# Patient Record
Sex: MALE | Race: WHITE | NOT HISPANIC OR LATINO | Employment: FULL TIME | URBAN - METROPOLITAN AREA
[De-identification: names, ages, dates, MRNs, and addresses within clinical notes are randomized per-mention and may not be internally consistent; named-entity substitution may affect disease eponyms.]

---

## 2017-03-03 ENCOUNTER — ALLSCRIPTS OFFICE VISIT (OUTPATIENT)
Dept: OTHER | Facility: OTHER | Age: 52
End: 2017-03-03

## 2017-03-03 LAB — OCCULT BLD, FECAL IMMUNOLOGICAL (HISTORICAL): NEGATIVE

## 2018-01-10 NOTE — PROGRESS NOTES
Assessment   1  Encounter for preventive health examination (V70 0) (Z00 00)  2  Erectile dysfunction (607 84) (N52 9)    Plan  Encounter for prostate cancer screening, History of hyperlipidemia    · (1) TSH; Status:Active; Requested for:03Mar2017;   Erectile dysfunction    · Start: Cialis 20 MG Oral Tablet; take as directed   · Start: Cialis 5 MG Oral Tablet; TAKE AS DIRECTED  Health Maintenance    · (1) COMPREHENSIVE METABOLIC PANEL; Status:Active; Requested [id number];    · Always use a seat belt and shoulder strap when riding or driving a motor vehicle ;  Status:Complete;   Done: 44GIQ5636 09:30AM   · Drink plenty of fluids ; Status:Complete;   Done: 44FJM2800 09:30AM   · Use a sun block product with an SPF of 15 or more ; Status:Complete;   Done:  90HZH5958 09:30AM   · We recommend that you follow the "Mediterranean diet "; Status:Complete;   Done:  89YWI5741 09:30AM  Health Maintenance, Encounter for prostate cancer screening    · (1) PSA (SCREEN) (Dx V76 44 Screen for Prostate Cancer); Status:Active; Requested  [id number]; Health Maintenance, Encounter for screening for cardiovascular disorders    · (1) LIPID PANEL, FASTING; Status:Active; Requested [id number]; Health Maintenance, History of hyperlipidemia    · (1) CBC/PLT/DIFF; Status:Active; Requested for:03Mar2017;   Refused influenza vaccine    · Temporarily Stop: Fluzone Quadrivalent 0 5 ML Intramuscular Suspension    Discussion/Summary  Impression: health maintenance visit  Currently, he eats a healthy diet  Prostate cancer screening: PSA was ordered  Testicular cancer screening: monthly self testicular exam was advised  Colorectal cancer screening: colorectal cancer screening is current  Screening lab work includes glucose and lipid profile  The immunizations are up to date  Advice and education were given regarding nutrition, aerobic exercise, weight bearing exercise, sunscreen use, helmet use and seat belt use        Chief Complaint  Patient is here today for his annual physical exam, L  Bell/ABBI      History of Present Illness  HM, Adult Male: The patient is being seen for a health maintenance evaluation  The last health maintenance visit was 12 month(s) ago  Social History: Household members include spouse and 1 son(s)  He is   Work status: working full time  The patient has never smoked cigarettes  He reports rare alcohol use  He has never used illicit drugs  General Health: The patient's health since the last visit is described as good  He has regular dental visits  He denies vision problems  He denies hearing loss  Immunizations status: up to date  Lifestyle:  He consumes a diverse and healthy diet  He does not have any weight concerns  He exercises regularly  He does not use tobacco  He denies drug use  Screening: Prostate cancer screening includes prostate-specific antigen testing last year  Testicular cancer screening includes monthly self testicular examinations  Colorectal cancer screening includes a colonoscopy within the past ten years  Metabolic screening includes lipid profile performed within the past five years, glucose screening performed last year and thyroid function test performed last year  Cardiovascular risk factors: no hypertension, no diabetes, no high LDL cholesterol, no low HDL cholesterol, no stress, no obesity, no tobacco use, no illicit drug use and no sedentary lifestyle  General health risks: no elevated prostate-specific antigen, no undescended testis, no previous colon polyps, no inflammatory bowel disease, no osteoporosis risk factors, no asbestos exposure and no radiation exposure  Safety elements used: seat belt, smoke detector and carbon monoxide detector  Review of Systems    Constitutional: No fever or chills, feels well, no tiredness, no recent weight gain or weight loss  Eyes: No complaints of eye pain, no red eyes, no discharge from eyes, no itchy eyes     ENT: no complaints of earache, no hearing loss, no nosebleeds, no nasal discharge, no sore throat, no hoarseness  Cardiovascular: No complaints of slow heart rate, no fast heart rate, no chest pain, no palpitations, no leg claudication, no lower extremity  Respiratory: No complaints of shortness of breath, no wheezing, no cough, no SOB on exertion, no orthopnea or PND  Gastrointestinal: No complaints of abdominal pain, no constipation, no nausea or vomiting, no diarrhea or bloody stools  Genitourinary: No complaints of dysuria, no incontinence, no hesitancy, no nocturia, no genital lesion, no testicular pain  Musculoskeletal: No complaints of arthralgia, no myalgias, no joint swelling or stiffness, no limb pain or swelling  Integumentary: No complaints of skin rash or skin lesions, no itching, no skin wound, no dry skin  Neurological: No compliants of headache, no confusion, no convulsions, no numbness or tingling, no dizziness or fainting, no limb weakness, no difficulty walking  Psychiatric: Is not suicidal, no sleep disturbances, no anxiety or depression, no change in personality, no emotional problems  Endocrine: No complaints of proptosis, no hot flashes, no muscle weakness, no erectile dysfunction, no deepening of the voice, no feelings of weakness  Hematologic/Lymphatic: No complaints of swollen glands, no swollen glands in the neck, does not bleed easily, no easy bruising  Active Problems   1  Colon cancer screening (V76 51) (Z12 11)  2  Encounter for screening for cardiovascular disorders (V81 2) (Z13 6)  3  History of hyperlipidemia (V12 29) (Z86 39)  4  Mid back pain (724 5) (M54 9)  5   Refused influenza vaccine (V64 06) (Z28 21)    Surgical History    · History of Adenoidectomy   · History of Appendectomy   · History of Gallbladder Surgery    Family History  Father    · Family history of Coronary artery disease involving coronary bypass graft of native heart  with other forms of angina pectoris   · Family history of essential hypertension (V17 49) (Z82 49)   · Family history of hypertension (V17 49) (Z82 49)   · Family history of Myocardial infarction involving other coronary artery  Maternal Cousin    · Family history of Myocardial infarction involving other coronary artery    Social History    · Caffeine use (V49 89) (F15 90)   · Drinks coffee   · Minimum alcohol consumption   · Never a smoker   · Patient ingests cola containing caffeine (V49 89) (Z78 9)    Current Meds  1  No Reported Medications Recorded    Allergies   1  No Known Drug Allergies    Vitals   Recorded: 30RTI7286 08:35AM   Temperature 98 F, Tympanic   Heart Rate 52, R Radial   Pulse Quality Normal, R Radial   Respiration Quality Normal   Respiration 16   Systolic 070, LUE, Sitting   Diastolic 80, LUE, Sitting   Height 5 ft 6 5 in   Weight 211 lb    BMI Calculated 33 55   BSA Calculated 2 06   O2 Saturation 96     Physical Exam    Constitutional   General appearance: No acute distress, well appearing and well nourished  Eyes   Conjunctiva and lids: No erythema, swelling or discharge  Pupils and irises: Equal, round, reactive to light  Ophthalmoscopic examination: Normal fundi and optic discs  Ears, Nose, Mouth, and Throat   External inspection of ears and nose: Normal     Otoscopic examination: Tympanic membranes translucent with normal light reflex  Canals patent without erythema  Hearing: Normal     Nasal mucosa, septum, and turbinates: Normal without edema or erythema  Lips, teeth, and gums: Normal, good dentition  Oropharynx: Normal with no erythema, edema, exudate or lesions  Neck   Neck: Supple, symmetric, trachea midline, no masses  Thyroid: Normal, no thyromegaly  Pulmonary   Respiratory effort: No increased work of breathing or signs of respiratory distress  Auscultation of lungs: Clear to auscultation      Cardiovascular   Auscultation of heart: Normal rate and rhythm, normal S1 and S2, no murmurs  Carotid pulses: 2+ bilaterally  Abdominal aorta: Normal     Examination of extremities for edema and/or varicosities: Normal     Abdomen   Abdomen: Non-tender, no masses  Liver and spleen: No hepatomegaly or splenomegaly  Examination for hernias: No hernias appreciated  Anus, perineum, and rectum: Normal sphincter tone, no masses, no prolapse  Stool sample for occult blood: Negative  Genitourinary   Scrotal contents: Normal testes, no masses  Penis: Normal, no lesions  Digital rectal exam of prostate: Normal size, no masses  Lymphatic   Palpation of lymph nodes in neck: No lymphadenopathy  Musculoskeletal   Gait and station: Normal     Inspection/palpation of digits and nails: Normal without clubbing or cyanosis  Inspection/palpation of joints, bones, and muscles: Normal     Range of motion: Normal     Stability: Normal     Muscle strength/tone: Normal     Skin   Skin and subcutaneous tissue: Normal without rashes or lesions  Palpation of skin and subcutaneous tissue: Normal turgor  Neurologic   Cranial nerves: Cranial nerves 2-12 intact  Reflexes: 2+ and symmetric  Sensation: No sensory loss  Psychiatric   Judgment and insight: Normal     Orientation to person, place and time: Normal     Recent and remote memory: Intact  Mood and affect: Normal        Results/Data  PHQ-2 Adult Depression Screening [id number] 08:35AM User, [facility]     Test Name Result Flag Reference   PHQ-2 Adult Depression Score 0     Over the last two weeks, how often have you been bothered by any of the following problems?   Little interest or pleasure in doing things: Not at all - 0  Feeling down, depressed, or hopeless: Not at all - 0   PHQ-2 Adult Depression Screening Negative         Signatures   Electronically signed by : Tomi Arroyo MD; Mar  3 2017  4:43PM EST                       (Author)

## 2018-01-12 VITALS
BODY MASS INDEX: 33.12 KG/M2 | OXYGEN SATURATION: 96 % | HEIGHT: 67 IN | RESPIRATION RATE: 16 BRPM | DIASTOLIC BLOOD PRESSURE: 80 MMHG | HEART RATE: 52 BPM | TEMPERATURE: 98 F | SYSTOLIC BLOOD PRESSURE: 128 MMHG | WEIGHT: 211 LBS

## 2018-07-02 ENCOUNTER — OFFICE VISIT (OUTPATIENT)
Dept: FAMILY MEDICINE CLINIC | Facility: CLINIC | Age: 53
End: 2018-07-02
Payer: COMMERCIAL

## 2018-07-02 VITALS
RESPIRATION RATE: 14 BRPM | WEIGHT: 205 LBS | BODY MASS INDEX: 30.36 KG/M2 | SYSTOLIC BLOOD PRESSURE: 142 MMHG | OXYGEN SATURATION: 99 % | HEIGHT: 69 IN | TEMPERATURE: 98 F | DIASTOLIC BLOOD PRESSURE: 80 MMHG | HEART RATE: 48 BPM

## 2018-07-02 DIAGNOSIS — Z00.00 HEALTH MAINTENANCE EXAMINATION: ICD-10-CM

## 2018-07-02 DIAGNOSIS — Z13.0 SCREENING FOR DEFICIENCY ANEMIA: ICD-10-CM

## 2018-07-02 DIAGNOSIS — Z13.29 SCREENING FOR THYROID DISORDER: ICD-10-CM

## 2018-07-02 DIAGNOSIS — Z28.39 IMMUNIZATION DEFICIENCY: Primary | ICD-10-CM

## 2018-07-02 DIAGNOSIS — Z12.5 SCREENING FOR PROSTATE CANCER: ICD-10-CM

## 2018-07-02 DIAGNOSIS — Z13.220 SCREENING FOR CHOLESTEROL LEVEL: ICD-10-CM

## 2018-07-02 LAB
SL AMB  POCT GLUCOSE, UA: NORMAL
SL AMB LEUKOCYTE ESTERASE,UA: NORMAL
SL AMB POCT BILIRUBIN,UA: NORMAL
SL AMB POCT BLOOD,UA: NORMAL
SL AMB POCT CLARITY,UA: CLEAR
SL AMB POCT COLOR,UA: YELLOW
SL AMB POCT KETONES,UA: NORMAL
SL AMB POCT NITRITE,UA: NORMAL
SL AMB POCT PH,UA: 7
SL AMB POCT SPECIFIC GRAVITY,UA: 1.01
SL AMB POCT URINE PROTEIN: NORMAL
SL AMB POCT UROBILINOGEN: NORMAL

## 2018-07-02 PROCEDURE — 81003 URINALYSIS AUTO W/O SCOPE: CPT | Performed by: FAMILY MEDICINE

## 2018-07-02 PROCEDURE — 99396 PREV VISIT EST AGE 40-64: CPT | Performed by: FAMILY MEDICINE

## 2018-07-02 PROCEDURE — 90715 TDAP VACCINE 7 YRS/> IM: CPT

## 2018-07-02 PROCEDURE — 90471 IMMUNIZATION ADMIN: CPT

## 2018-07-02 RX ORDER — TADALAFIL 20 MG/1
20 TABLET ORAL DAILY PRN
COMMUNITY
End: 2020-03-05 | Stop reason: SDUPTHER

## 2018-07-02 NOTE — PROGRESS NOTES
Assessment/Plan:  Health maintenance  Laboratory evaluation as noted  Mediterranean diet  Regular exercise  Seatbelts  Smoke detectors  CO detectors  Adacel given today  Follow-up 1 year  Home monitoring of blood pressure  Subjective:      Patient ID: Jersey Coe is a 46 y o  male  This is a 58-year-old gentleman who presents for a health maintenance exam             Review of Systems   Constitutional: Negative  HENT: Negative  Eyes: Negative  Respiratory: Negative  Cardiovascular: Negative  Gastrointestinal: Negative  Musculoskeletal: Negative  Neurological: Negative  Objective:      /80 (BP Location: Right arm, Patient Position: Sitting, Cuff Size: Standard)   Pulse (!) 48   Temp 98 °F (36 7 °C) (Temporal)   Resp 14   Ht 5' 9" (1 753 m)   Wt 93 kg (205 lb)   SpO2 99%   BMI 30 27 kg/m²          Physical Exam   Constitutional: He is oriented to person, place, and time  He appears well-developed and well-nourished  HENT:   Head: Normocephalic and atraumatic  Right Ear: External ear normal    Left Ear: External ear normal    Nose: Nose normal    Mouth/Throat: Oropharynx is clear and moist  No oropharyngeal exudate  Eyes: Conjunctivae and EOM are normal  Pupils are equal, round, and reactive to light  Right eye exhibits no discharge  Left eye exhibits no discharge  No scleral icterus  Neck: Normal range of motion  No JVD present  No tracheal deviation present  No thyromegaly present  Cardiovascular: Normal rate, regular rhythm and normal heart sounds  Exam reveals no gallop and no friction rub  No murmur heard  Pulmonary/Chest: Effort normal and breath sounds normal  No stridor  No respiratory distress  He has no wheezes  He has no rales  He exhibits no tenderness  Abdominal: Soft  Bowel sounds are normal  He exhibits no distension and no mass  There is no tenderness  There is no rebound and no guarding     Genitourinary: Prostate normal  Rectal exam shows guaiac negative stool  Musculoskeletal: Normal range of motion  He exhibits no edema, tenderness or deformity  Lymphadenopathy:     He has no cervical adenopathy  Neurological: He is alert and oriented to person, place, and time  No cranial nerve deficit  Psychiatric: He has a normal mood and affect   His behavior is normal  Judgment and thought content normal

## 2018-07-03 LAB
ALBUMIN SERPL-MCNC: 4.3 G/DL (ref 3.5–5.5)
ALBUMIN/GLOB SERPL: 1.5 {RATIO} (ref 1.2–2.2)
ALP SERPL-CCNC: 79 IU/L (ref 39–117)
ALT SERPL-CCNC: 18 IU/L (ref 0–44)
AST SERPL-CCNC: 17 IU/L (ref 0–40)
BASOPHILS # BLD AUTO: 0 X10E3/UL (ref 0–0.2)
BASOPHILS NFR BLD AUTO: 0 %
BILIRUB SERPL-MCNC: 0.4 MG/DL (ref 0–1.2)
BUN SERPL-MCNC: 14 MG/DL (ref 6–24)
BUN/CREAT SERPL: 15 (ref 9–20)
CALCIUM SERPL-MCNC: 9.6 MG/DL (ref 8.7–10.2)
CHLORIDE SERPL-SCNC: 103 MMOL/L (ref 96–106)
CHOLEST SERPL-MCNC: 186 MG/DL (ref 100–199)
CHOLEST/HDLC SERPL: 3.4 RATIO (ref 0–5)
CO2 SERPL-SCNC: 25 MMOL/L (ref 20–29)
CREAT SERPL-MCNC: 0.93 MG/DL (ref 0.76–1.27)
EOSINOPHIL # BLD AUTO: 0.1 X10E3/UL (ref 0–0.4)
EOSINOPHIL NFR BLD AUTO: 2 %
ERYTHROCYTE [DISTWIDTH] IN BLOOD BY AUTOMATED COUNT: 13.5 % (ref 12.3–15.4)
GLOBULIN SER-MCNC: 2.8 G/DL (ref 1.5–4.5)
GLUCOSE SERPL-MCNC: 103 MG/DL (ref 65–99)
HCT VFR BLD AUTO: 44.5 % (ref 37.5–51)
HDLC SERPL-MCNC: 55 MG/DL
HGB BLD-MCNC: 14.8 G/DL (ref 13–17.7)
IMM GRANULOCYTES # BLD: 0 X10E3/UL (ref 0–0.1)
IMM GRANULOCYTES NFR BLD: 0 %
LDLC SERPL CALC-MCNC: 116 MG/DL (ref 0–99)
LYMPHOCYTES # BLD AUTO: 1.4 X10E3/UL (ref 0.7–3.1)
LYMPHOCYTES NFR BLD AUTO: 25 %
MCH RBC QN AUTO: 32.5 PG (ref 26.6–33)
MCHC RBC AUTO-ENTMCNC: 33.3 G/DL (ref 31.5–35.7)
MCV RBC AUTO: 98 FL (ref 79–97)
MONOCYTES # BLD AUTO: 0.4 X10E3/UL (ref 0.1–0.9)
MONOCYTES NFR BLD AUTO: 8 %
NEUTROPHILS # BLD AUTO: 3.8 X10E3/UL (ref 1.4–7)
NEUTROPHILS NFR BLD AUTO: 65 %
PLATELET # BLD AUTO: 197 X10E3/UL (ref 150–379)
POTASSIUM SERPL-SCNC: 5.4 MMOL/L (ref 3.5–5.2)
PROT SERPL-MCNC: 7.1 G/DL (ref 6–8.5)
RBC # BLD AUTO: 4.55 X10E6/UL (ref 4.14–5.8)
SL AMB EGFR AFRICAN AMERICAN: 107 ML/MIN/1.73
SL AMB EGFR NON AFRICAN AMERICAN: 93 ML/MIN/1.73
SL AMB VLDL CHOLESTEROL CALC: 15 MG/DL (ref 5–40)
SODIUM SERPL-SCNC: 142 MMOL/L (ref 134–144)
TRIGL SERPL-MCNC: 74 MG/DL (ref 0–149)
WBC # BLD AUTO: 5.7 X10E3/UL (ref 3.4–10.8)

## 2018-07-04 LAB
PSA FREE MFR SERPL: 28.6 %
PSA FREE SERPL-MCNC: 0.2 NG/ML
PSA SERPL-MCNC: 0.7 NG/ML (ref 0–4)
TSH SERPL DL<=0.005 MIU/L-ACNC: 2.14 UIU/ML (ref 0.45–4.5)

## 2018-08-09 ENCOUNTER — OFFICE VISIT (OUTPATIENT)
Dept: FAMILY MEDICINE CLINIC | Facility: CLINIC | Age: 53
End: 2018-08-09
Payer: COMMERCIAL

## 2018-08-09 VITALS
OXYGEN SATURATION: 98 % | HEART RATE: 54 BPM | SYSTOLIC BLOOD PRESSURE: 122 MMHG | RESPIRATION RATE: 16 BRPM | WEIGHT: 207.6 LBS | DIASTOLIC BLOOD PRESSURE: 80 MMHG | BODY MASS INDEX: 29.06 KG/M2 | TEMPERATURE: 97.1 F | HEIGHT: 71 IN

## 2018-08-09 DIAGNOSIS — M54.12 CERVICAL RADICULOPATHY: ICD-10-CM

## 2018-08-09 PROCEDURE — 99213 OFFICE O/P EST LOW 20 MIN: CPT | Performed by: FAMILY MEDICINE

## 2018-08-09 PROCEDURE — 3008F BODY MASS INDEX DOCD: CPT | Performed by: FAMILY MEDICINE

## 2018-08-09 PROCEDURE — 1036F TOBACCO NON-USER: CPT | Performed by: FAMILY MEDICINE

## 2018-08-09 RX ORDER — METHYLPREDNISOLONE 4 MG/1
TABLET ORAL
Qty: 21 TABLET | Refills: 0 | Status: SHIPPED | OUTPATIENT
Start: 2018-08-09 | End: 2020-02-26

## 2018-08-09 NOTE — PROGRESS NOTES
Assessment/Plan:  Cervical radiculopathy  Medrol Dosepak  Gentle stretching exercises  Alternating heat with ice 10-15 minutes on q 4 hours  Massage therapy  Call with any questions or concerns  Call immediately for change in symptomatology  Diagnoses and all orders for this visit:    Labor abnormality  -     Comprehensive metabolic panel    Cervical radiculopathy  -     Methylprednisolone 4 MG TBPK; Use as directed on package          Subjective:     Patient ID: Britney Roman is a 46 y o  male  This is a 42-year-old gentleman who presents right-sided neck pain which radiates to the right upper extremity  Review of Systems   Constitutional: Negative  Respiratory: Negative  Cardiovascular: Negative  Musculoskeletal: Positive for neck pain and neck stiffness  Neurological: Negative  Objective:     Physical Exam   Constitutional: He is oriented to person, place, and time  He appears well-developed and well-nourished  HENT:   Head: Normocephalic and atraumatic  Musculoskeletal:   Tenderness right trapezius, rhomboids and scapular muscles  Neurological: He is alert and oriented to person, place, and time  No cranial nerve deficit   Coordination normal

## 2018-08-10 LAB
ALBUMIN SERPL-MCNC: 4.3 G/DL (ref 3.5–5.5)
ALBUMIN/GLOB SERPL: 1.7 {RATIO} (ref 1.2–2.2)
ALP SERPL-CCNC: 80 IU/L (ref 39–117)
ALT SERPL-CCNC: 22 IU/L (ref 0–44)
AST SERPL-CCNC: 20 IU/L (ref 0–40)
BILIRUB SERPL-MCNC: 0.4 MG/DL (ref 0–1.2)
BUN SERPL-MCNC: 18 MG/DL (ref 6–24)
BUN/CREAT SERPL: 21 (ref 9–20)
CALCIUM SERPL-MCNC: 9.2 MG/DL (ref 8.7–10.2)
CHLORIDE SERPL-SCNC: 105 MMOL/L (ref 96–106)
CO2 SERPL-SCNC: 23 MMOL/L (ref 20–29)
CREAT SERPL-MCNC: 0.86 MG/DL (ref 0.76–1.27)
GLOBULIN SER-MCNC: 2.5 G/DL (ref 1.5–4.5)
GLUCOSE SERPL-MCNC: 98 MG/DL (ref 65–99)
POTASSIUM SERPL-SCNC: 4.6 MMOL/L (ref 3.5–5.2)
PROT SERPL-MCNC: 6.8 G/DL (ref 6–8.5)
SL AMB EGFR AFRICAN AMERICAN: 114 ML/MIN/1.73
SL AMB EGFR NON AFRICAN AMERICAN: 98 ML/MIN/1.73
SODIUM SERPL-SCNC: 143 MMOL/L (ref 134–144)

## 2018-12-06 ENCOUNTER — OFFICE VISIT (OUTPATIENT)
Dept: FAMILY MEDICINE CLINIC | Facility: CLINIC | Age: 53
End: 2018-12-06
Payer: COMMERCIAL

## 2018-12-06 VITALS
TEMPERATURE: 98.6 F | DIASTOLIC BLOOD PRESSURE: 80 MMHG | WEIGHT: 213 LBS | SYSTOLIC BLOOD PRESSURE: 110 MMHG | RESPIRATION RATE: 12 BRPM | BODY MASS INDEX: 29.82 KG/M2 | OXYGEN SATURATION: 98 % | HEART RATE: 65 BPM | HEIGHT: 71 IN

## 2018-12-06 DIAGNOSIS — J06.9 UPPER RESPIRATORY TRACT INFECTION, UNSPECIFIED TYPE: ICD-10-CM

## 2018-12-06 DIAGNOSIS — J02.9 PHARYNGITIS, UNSPECIFIED ETIOLOGY: Primary | ICD-10-CM

## 2018-12-06 PROCEDURE — 99213 OFFICE O/P EST LOW 20 MIN: CPT | Performed by: NURSE PRACTITIONER

## 2018-12-06 RX ORDER — CEFUROXIME AXETIL 250 MG/1
250 TABLET ORAL EVERY 12 HOURS SCHEDULED
Qty: 14 TABLET | Refills: 0 | Status: SHIPPED | OUTPATIENT
Start: 2018-12-06 | End: 2018-12-13

## 2018-12-06 NOTE — PATIENT INSTRUCTIONS
Increase fluid intake as tolerated  Rest and humidification   Continue medications as directed - watchful waiting for 5-6 days  - antibiotic for full course  - pro-biotic to protect stomach while on medication   - Flonase OTC 1-2 sprays each nostril daily PRN post nasal drip   - Mucinex OTC to loosen secretions   Return to office in one week if symptoms persist or worsen

## 2018-12-08 ENCOUNTER — TELEPHONE (OUTPATIENT)
Dept: OTHER | Facility: OTHER | Age: 53
End: 2018-12-08

## 2018-12-08 NOTE — TELEPHONE ENCOUNTER
Enrique Rani 1965  CONFIDENTIALTY NOTICE: This fax transmission is intended only for the addressee  It contains information that is legally privileged,  confidential or otherwise protected from use or disclosure  If you are not the intended recipient, you are strictly prohibited from reviewing,  disclosing, copying using or disseminating any of this information or taking any action in reliance on or regarding this information  If you have  received this fax in error, please notify us immediately by telephone so that we can arrange for its return to us  Page: 1 of 2  Call Id: [id number]  Health Call  Standard Call Report  Health Call  Patient Name: Enrique Saravia  Gender: Male  : 1965  Age: 46 Y 9 M 12 D  Return Phone  Number: [phone] (Home)  Address: [facility]: [address] [place] [address]  [address]  Practice Name: [address] [place] [address]  Practice Charged:  Physician:  Christa Ness Name: Rajinder Topete  Relationship To  Patient: Spouse  Return Phone Number: [phone] (Home)  Presenting Problem: "My  has a cough and feels  achy "  Service Type: Triage  Charged Service 1: Neris Carter U  38  Name and  Number:  Nurse Assessment  Nurse: Jeannie Greenberg RN, Kailee Herr Date/Time: 2018 11:04:07 AM  Type of assessment required:  ---General (Adult or Child)  Duration of Current S/S  ---Since Thursday morning  Location/Radiation  ---Chest / Nose  Temperature (F) and route:  ---Denies fever  Symptom Specific Meds (Dose/Time):  ---None  Other S/S  ---Dry cough  No runny or stuffy nose  Muscles feel achy  Pain Scale on scale of 1-10, 10 being the worst:  ---Denies pain  Symptom progression:  ---same  Intake and Output  ---Drinking normally / WNL  Last Exam/Treatment:  Enrique Saravia 1965  CONFIDENTIALTY NOTICE: This fax transmission is intended only for the addressee  It contains information that is legally privileged,  confidential or otherwise protected from use or disclosure   If you are not the intended recipient, you are strictly prohibited from reviewing,  disclosing, copying using or disseminating any of this information or taking any action in reliance on or regarding this information  If you have  received this fax in error, please notify us immediately by telephone so that we can arrange for its return to us  Page: 2 of 2  Call Id: [phone]  Nurse Assessment  ---Seen in the office on Thursday and diagnosed with an upper respiratory infection  Was prescribed Ceftin antibiotic but told to wait a few days incase symptoms are viral  in nature  Protocols  Protocol Title Nurse Date/Time  Cough - Acute Non-Productive Otto, ROSARIO, Edith Solis 12/8/2018 11:07:16 AM  Question Caller Affirmed  Disp  Time Disposition Final User  12/8/2018 11:15:35 [address] [place] [address],[address], RN, Edith Solis  12/8/2018 11:17:52 AM RN Triaged Yes Aleksandra Sabillon RN, [facility] Advice Given Per Protocol  HOME CARE: You should be able to treat this at home  REASSURANCE: * Coughing is the way that our lungs remove irritants and  mucus  It helps protect our lungs from getting pneumonia  * You can get a dry hacking cough after a chest cold  Sometimes this type  of cough can last 1-3 weeks, and be worse at night  * You can also get a cough after being exposed to irritating substances like smoke,  strong perfumes, and dust  COUGH DROPS FOR COUGH: * Cough drops can help a lot, especially for mild coughs  They reduce  coughing by soothing your irritated throat and removing that tickle sensation in the back of the throat  * Cough drops also have the  advantage of portability - you can carry them with you  * Cough drops are available over-the-counter (OTC)  HOME REMEDY - HARD  CANDY: Hard candy works just as well as a medicine-flavored OTC cough drops  COUGH MEDICINES: * OTC COUGH SYRUPS:  The most common cough suppressant in OTC cough medications is dextromethorphan  Often the letters 'DM' appear in the name   * OTC  COUGH DROPS: Cough drops can help a lot, especially for mild coughs  They reduce coughing by soothing your irritated throat and  removing that tickle sensation in the back of the throat  Cough drops also have the advantage of portability - you can carry them with  you  * HOME REMEDY - HARD CANDY: Hard candy works just as well as medicine-flavored OTC cough drops  People who have  diabetes should use sugar-free candy  * HOME REMEDY - HONEY: This old home remedy has been shown to help decrease coughing  at night  The adult dosage is 2 teaspoons (10 ml) at bedtime  Honey should not be given to infants under one year of age  OTC COUGH  SYRUP - DEXTROMETHORPHAN: * Cough syrups containing the cough suppressant dextromethorphan (DM) may help decrease your  cough  Cough syrups work best for coughs that keep you awake at night  They can also sometimes help in the late stages of a respiratory  infection when the cough is dry and hacking  They can be used along with cough drops  * Examples: Benylin, Robitussin DM, Vicks 44  Cough Relief * Read the package instructions for dosage, contraindications, and other important information  HUMIDIFIER: If the air is  dry, use a humidifier in the bedroom  (Reason: dry air makes coughs worse) AVOID TOBACCO SMOKE: Smoking or being exposed  to smoke makes coughs much worse  COUGHING SPELLS: * Drink warm fluids  Inhale warm mist  (Reason: both relax the airway and  loosen up the phlegm) * Suck on cough drops or hard candy to coat the irritated throat  CALL BACK IF: * Cough lasts over 3 weeks *  Continuous coughing persists over 2 hours after cough treatment * Fever over 103 F (39 4 C) * Fever lasts more than 3 days * Difficulty  breathing occurs * You become worse  CARE ADVICE given per Cough - Acute Non-Productive (Adult) guideline    Caller Understands: Yes  Caller Disagree/Comply: Comply  PreDisposition: Unsure

## 2018-12-10 PROBLEM — J02.9 PHARYNGITIS: Status: ACTIVE | Noted: 2018-12-10

## 2018-12-10 PROBLEM — J06.9 UPPER RESPIRATORY TRACT INFECTION: Status: ACTIVE | Noted: 2018-12-10

## 2018-12-10 NOTE — PROGRESS NOTES
Assessment/Plan:    Problem List Items Addressed This Visit     Pharyngitis - Primary     Discussed likely viral etiology of illness and instructed pt to perform watchful waiting x's 6-7 days, if no improvement use anbx management as discussed  Instructed pt on supportive care including fluids, rest and nutrition         Upper respiratory tract infection    Relevant Medications    cefuroxime (CEFTIN) 250 mg tablet - HOLD, watchful waiting x's 6-7 days        Patient Instructions   Increase fluid intake as tolerated  Rest and humidification   Continue medications as directed - watchful waiting for 5-6 days  - antibiotic for full course  - pro-biotic to protect stomach while on medication   - Flonase OTC 1-2 sprays each nostril daily PRN post nasal drip   - Mucinex OTC to loosen secretions   Return to office in one week if symptoms persist or worsen        Return in about 1 week (around 12/13/2018)  Subjective:      Patient ID: Zhanna Pate is a 46 y o  male  Chief Complaint   Patient presents with    Cold Like Symptoms    Sore Throat       Sore Throat    This is a new problem  The current episode started yesterday  The problem has been unchanged  There has been no fever  Pertinent negatives include no abdominal pain, congestion, coughing, diarrhea, ear discharge, ear pain, headaches, plugged ear sensation, shortness of breath, swollen glands, trouble swallowing or vomiting  He has tried nothing for the symptoms  The following portions of the patient's history were reviewed and updated as appropriate: allergies, current medications, past family history, past medical history, past social history, past surgical history and problem list     Review of Systems   Constitutional: Negative for chills, fatigue and fever  HENT: Positive for sore throat  Negative for congestion, ear discharge, ear pain and trouble swallowing  Eyes: Negative for discharge     Respiratory: Negative for cough and shortness of breath  Cardiovascular: Negative for chest pain  Gastrointestinal: Negative for abdominal pain, diarrhea and vomiting  Neurological: Negative for headaches  Current Outpatient Prescriptions   Medication Sig Dispense Refill    cefuroxime (CEFTIN) 250 mg tablet Take 1 tablet (250 mg total) by mouth every 12 (twelve) hours for 7 days 14 tablet 0    Methylprednisolone 4 MG TBPK Use as directed on package (Patient not taking: Reported on 12/6/2018 ) 21 tablet 0    tadalafil (CIALIS) 20 MG tablet Take 20 mg by mouth daily as needed for erectile dysfunction       No current facility-administered medications for this visit  Objective:    /80 (BP Location: Right arm, Patient Position: Sitting, Cuff Size: Large)   Pulse 65   Temp 98 6 °F (37 °C) (Temporal)   Resp 12   Ht 5' 10 5" (1 791 m)   Wt 96 6 kg (213 lb)   SpO2 98%   BMI 30 13 kg/m²        Physical Exam   Constitutional: He is oriented to person, place, and time  He appears well-developed and well-nourished  No distress  HENT:   Head: Normocephalic and atraumatic  Eyes: Conjunctivae are normal  Right eye exhibits no discharge  Left eye exhibits no discharge  Neck: Normal range of motion  Neck supple  No thyromegaly present  Cardiovascular: Normal rate, regular rhythm and normal heart sounds  Pulmonary/Chest: Effort normal and breath sounds normal  No respiratory distress  He has no decreased breath sounds  He has no wheezes  He has no rhonchi  He has no rales  Lymphadenopathy:     He has no cervical adenopathy  Neurological: He is alert and oriented to person, place, and time  Skin: Skin is warm and dry  No rash noted  He is not diaphoretic  Psychiatric: He has a normal mood and affect   His behavior is normal  Judgment and thought content normal          MELIZA Hewitt

## 2018-12-10 NOTE — ASSESSMENT & PLAN NOTE
Discussed likely viral etiology of illness and instructed pt to perform watchful waiting x's 6-7 days, if no improvement use anbx management as discussed  Instructed pt on supportive care including fluids, rest and nutrition

## 2018-12-12 NOTE — TELEPHONE ENCOUNTER
Triage     Val Wright routed conversation to [facility] 4 days ago      MishaTiffanie oatesKaren [phone]  Val Wright 4 days ago      Val Wright 4 days ago         Anamaria Born 1965  CONFIDENTIALTY NOTICE: This fax transmission is intended only for the addressee  It contains information that is legally privileged,  confidential or otherwise protected from use or disclosure  If you are not the intended recipient, you are strictly prohibited from reviewing,  disclosing, copying using or disseminating any of this information or taking any action in reliance on or regarding this information  If you have  received this fax in error, please notify us immediately by telephone so that we can arrange for its return to us  Page: 1   Call Id: [id number]  Health Call  Standard Call Report  Health Call  Patient Name: Anamaria Born  Gender: Male  : 1965  Age: 46 Y 9 M 12 D  Return Phone  Number: [phone] (Home)  Address: [facility]/Zip: [address] [place], [place] [id number]  Practice Name: [address] [place] [address]  Practice Charged:  Physician:  [address] [place] [address] Name: Talisha Velez  Relationship To  Patient: Spouse  Return Phone Number: [phone] (Home)  Presenting Problem: "My  has a cough and feels  achy "  Service Type: Triage  Charged Service 1: Neris Carter U  38  Name and  Number:  Nurse Assessment  Nurse: Bear Morales RN, Veronica Marroquin Date/Time: 2018 11:04:07 AM  Type of assessment required:  ---General (Adult or Child)  Duration of Current S/S  ---Since Thursday morning  Location/Radiation  ---Chest / Nose  Temperature (F) and route:  ---Denies fever  Symptom Specific Meds (Dose/Time):  ---None  Other S/S  ---Dry cough  No runny or stuffy nose  Muscles feel achy  Pain Scale on scale of 1-10, 10 being the worst:  ---Denies pain    Symptom progression:  ---same  Intake and Output  ---Drinking normally / WNL  Last Exam/Treatment:  Anamaria Born 1965  CONFIDENTIALTY NOTICE: This fax transmission is intended only for the addressee  It contains information that is legally privileged,  confidential or otherwise protected from use or disclosure  If you are not the intended recipient, you are strictly prohibited from reviewing,  disclosing, copying using or disseminating any of this information or taking any action in reliance on or regarding this information  If you have  received this fax in error, please notify us immediately by telephone so that we can arrange for its return to us  Page: 2 of 2  Call Id: [id number]  Nurse Assessment  ---Seen in the office on Thursday and diagnosed with an upper respiratory infection  Was prescribed Ceftin antibiotic but told to wait a few days incase symptoms are viral  in nature  Protocols  Protocol Title Nurse Date/Time  Cough - Acute Non-Productive ROSARIO Menjivar, Chapo Dickey 12/8/2018 11:07:16 AM  Question Caller Affirmed  Disp  Time Disposition Final User  12/8/2018 11:15:35 [id number] S  Kvng Metz RN, Chapo Dickey  12/8/2018 11:17:52 AM RN Triaged Yes Aubree Lauren RN, [facility] Advice Given Per Protocol  HOME CARE: You should be able to treat this at home  REASSURANCE: * Coughing is the way that our lungs remove irritants and  mucus  It helps protect our lungs from getting pneumonia  * You can get a dry hacking cough after a chest cold  Sometimes this type  of cough can last 1-3 weeks, and be worse at night  * You can also get a cough after being exposed to irritating substances like smoke,  strong perfumes, and dust  COUGH DROPS FOR COUGH: * Cough drops can help a lot, especially for mild coughs  They reduce  coughing by soothing your irritated throat and removing that tickle sensation in the back of the throat  * Cough drops also have the  advantage of portability - you can carry them with you  * Cough drops are available over-the-counter (OTC)   HOME REMEDY - HARD  CANDY: Hard candy works just as well as a medicine-flavored OTC cough drops  COUGH MEDICINES: * OTC COUGH SYRUPS:  The most common cough suppressant in OTC cough medications is dextromethorphan  Often the letters 'DM' appear in the name  * OTC  COUGH DROPS: Cough drops can help a lot, especially for mild coughs  They reduce coughing by soothing your irritated throat and  removing that tickle sensation in the back of the throat  Cough drops also have the advantage of portability - you can carry them with  you  * HOME REMEDY - HARD CANDY: Hard candy works just as well as medicine-flavored OTC cough drops  People who have  diabetes should use sugar-free candy  * HOME REMEDY - HONEY: This old home remedy has been shown to help decrease coughing  at night  The adult dosage is 2 teaspoons (10 ml) at bedtime  Honey should not be given to infants under one year of age  OTC COUGH  SYRUP - DEXTROMETHORPHAN: * Cough syrups containing the cough suppressant dextromethorphan (DM) may help decrease your  cough  Cough syrups work best for coughs that keep you awake at night  They can also sometimes help in the late stages of a respiratory  infection when the cough is dry and hacking  They can be used along with cough drops  * Examples: Benylin, Robitussin DM, Vicks 44  Cough Relief * Read the package instructions for dosage, contraindications, and other important information  HUMIDIFIER: If the air is  dry, use a humidifier in the bedroom  (Reason: dry air makes coughs worse) AVOID TOBACCO SMOKE: Smoking or being exposed  to smoke makes coughs much worse  COUGHING SPELLS: * Drink warm fluids  Inhale warm mist  (Reason: both relax the airway and  loosen up the phlegm) * Suck on cough drops or hard candy to coat the irritated throat  CALL BACK IF: * Cough lasts over 3 weeks *  Continuous coughing persists over 2 hours after cough treatment * Fever over 103 F (39 4 C) * Fever lasts more than 3 days * Difficulty  breathing occurs * You become worse   CARE ADVICE given per Cough - Acute Non-Productive (Adult) guideline    Caller Understands: Yes  Caller Disagree/Comply: Comply  PreDisposition: Unsure

## 2020-02-17 ENCOUNTER — TELEPHONE (OUTPATIENT)
Dept: FAMILY MEDICINE CLINIC | Facility: CLINIC | Age: 55
End: 2020-02-17

## 2020-02-17 PROBLEM — N52.9 ERECTILE DYSFUNCTION: Status: ACTIVE | Noted: 2017-03-03

## 2020-02-17 PROBLEM — Z11.4 SCREENING FOR HIV (HUMAN IMMUNODEFICIENCY VIRUS): Status: ACTIVE | Noted: 2020-02-17

## 2020-02-17 PROBLEM — J06.9 UPPER RESPIRATORY TRACT INFECTION: Status: RESOLVED | Noted: 2018-12-10 | Resolved: 2020-02-17

## 2020-02-17 PROBLEM — Z13.220 SCREENING FOR HYPERLIPIDEMIA: Status: ACTIVE | Noted: 2020-02-17

## 2020-02-17 PROBLEM — Z12.5 ENCOUNTER FOR PROSTATE CANCER SCREENING: Status: ACTIVE | Noted: 2020-02-17

## 2020-02-17 PROBLEM — Z11.59 ENCOUNTER FOR HEPATITIS C SCREENING TEST FOR LOW RISK PATIENT: Status: ACTIVE | Noted: 2020-02-17

## 2020-02-17 PROBLEM — Z13.6 SCREENING FOR HYPERTENSION: Status: ACTIVE | Noted: 2020-02-17

## 2020-02-17 PROBLEM — Z00.00 ROUTINE GENERAL MEDICAL EXAMINATION AT A HEALTH CARE FACILITY: Status: ACTIVE | Noted: 2020-02-17

## 2020-02-17 PROBLEM — J02.9 PHARYNGITIS: Status: RESOLVED | Noted: 2018-12-10 | Resolved: 2020-02-17

## 2020-02-17 PROBLEM — Z13.29 SCREENING FOR HYPOTHYROIDISM: Status: ACTIVE | Noted: 2020-02-17

## 2020-02-19 ENCOUNTER — CLINICAL SUPPORT (OUTPATIENT)
Dept: FAMILY MEDICINE CLINIC | Facility: CLINIC | Age: 55
End: 2020-02-19
Payer: COMMERCIAL

## 2020-02-19 DIAGNOSIS — Z00.00 ROUTINE GENERAL MEDICAL EXAMINATION AT A HEALTH CARE FACILITY: Primary | ICD-10-CM

## 2020-02-19 DIAGNOSIS — Z11.4 SCREENING FOR HIV (HUMAN IMMUNODEFICIENCY VIRUS): ICD-10-CM

## 2020-02-19 DIAGNOSIS — Z13.29 SCREENING FOR HYPOTHYROIDISM: ICD-10-CM

## 2020-02-19 DIAGNOSIS — Z12.5 ENCOUNTER FOR PROSTATE CANCER SCREENING: ICD-10-CM

## 2020-02-19 DIAGNOSIS — Z11.59 ENCOUNTER FOR HEPATITIS C SCREENING TEST FOR LOW RISK PATIENT: ICD-10-CM

## 2020-02-19 DIAGNOSIS — Z13.6 SCREENING FOR HYPERTENSION: ICD-10-CM

## 2020-02-19 DIAGNOSIS — Z13.220 SCREENING FOR HYPERLIPIDEMIA: ICD-10-CM

## 2020-02-19 PROCEDURE — 36415 COLL VENOUS BLD VENIPUNCTURE: CPT

## 2020-02-20 LAB
ALBUMIN SERPL-MCNC: 4.3 G/DL (ref 3.8–4.9)
ALBUMIN/GLOB SERPL: 1.7 {RATIO} (ref 1.2–2.2)
ALP SERPL-CCNC: 77 IU/L (ref 39–117)
ALT SERPL-CCNC: 27 IU/L (ref 0–44)
AST SERPL-CCNC: 20 IU/L (ref 0–40)
BASOPHILS # BLD AUTO: 0 X10E3/UL (ref 0–0.2)
BASOPHILS NFR BLD AUTO: 1 %
BILIRUB SERPL-MCNC: 0.6 MG/DL (ref 0–1.2)
BUN SERPL-MCNC: 15 MG/DL (ref 6–24)
BUN/CREAT SERPL: 17 (ref 9–20)
CALCIUM SERPL-MCNC: 9.5 MG/DL (ref 8.7–10.2)
CHLORIDE SERPL-SCNC: 103 MMOL/L (ref 96–106)
CHOLEST SERPL-MCNC: 198 MG/DL (ref 100–199)
CHOLEST/HDLC SERPL: 3.7 RATIO (ref 0–5)
CO2 SERPL-SCNC: 25 MMOL/L (ref 20–29)
CREAT SERPL-MCNC: 0.86 MG/DL (ref 0.76–1.27)
EOSINOPHIL # BLD AUTO: 0.1 X10E3/UL (ref 0–0.4)
EOSINOPHIL NFR BLD AUTO: 2 %
ERYTHROCYTE [DISTWIDTH] IN BLOOD BY AUTOMATED COUNT: 13.9 % (ref 11.6–15.4)
GLOBULIN SER-MCNC: 2.6 G/DL (ref 1.5–4.5)
GLUCOSE SERPL-MCNC: 99 MG/DL (ref 65–99)
HCT VFR BLD AUTO: 45.8 % (ref 37.5–51)
HCV AB S/CO SERPL IA: <0.1 S/CO RATIO (ref 0–0.9)
HDLC SERPL-MCNC: 54 MG/DL
HGB BLD-MCNC: 14.8 G/DL (ref 13–17.7)
HIV 1+2 AB+HIV1 P24 AG SERPL QL IA: NON REACTIVE
IMM GRANULOCYTES # BLD: 0 X10E3/UL (ref 0–0.1)
IMM GRANULOCYTES NFR BLD: 0 %
LDLC SERPL CALC-MCNC: 129 MG/DL (ref 0–99)
LYMPHOCYTES # BLD AUTO: 1.6 X10E3/UL (ref 0.7–3.1)
LYMPHOCYTES NFR BLD AUTO: 32 %
MCH RBC QN AUTO: 31.7 PG (ref 26.6–33)
MCHC RBC AUTO-ENTMCNC: 32.3 G/DL (ref 31.5–35.7)
MCV RBC AUTO: 98 FL (ref 79–97)
MONOCYTES # BLD AUTO: 0.4 X10E3/UL (ref 0.1–0.9)
MONOCYTES NFR BLD AUTO: 7 %
NEUTROPHILS # BLD AUTO: 2.9 X10E3/UL (ref 1.4–7)
NEUTROPHILS NFR BLD AUTO: 58 %
PLATELET # BLD AUTO: 223 X10E3/UL (ref 150–450)
POTASSIUM SERPL-SCNC: 4.8 MMOL/L (ref 3.5–5.2)
PROT SERPL-MCNC: 6.9 G/DL (ref 6–8.5)
PSA SERPL-MCNC: 0.6 NG/ML (ref 0–4)
RBC # BLD AUTO: 4.67 X10E6/UL (ref 4.14–5.8)
SL AMB EGFR AFRICAN AMERICAN: 113 ML/MIN/1.73
SL AMB EGFR NON AFRICAN AMERICAN: 98 ML/MIN/1.73
SL AMB REFLEX CRITERIA: NORMAL
SL AMB VLDL CHOLESTEROL CALC: 15 MG/DL (ref 5–40)
SODIUM SERPL-SCNC: 143 MMOL/L (ref 134–144)
TRIGL SERPL-MCNC: 73 MG/DL (ref 0–149)
TSH SERPL DL<=0.005 MIU/L-ACNC: 1.3 UIU/ML (ref 0.45–4.5)
WBC # BLD AUTO: 5 X10E3/UL (ref 3.4–10.8)

## 2020-02-26 ENCOUNTER — OFFICE VISIT (OUTPATIENT)
Dept: FAMILY MEDICINE CLINIC | Facility: CLINIC | Age: 55
End: 2020-02-26
Payer: COMMERCIAL

## 2020-02-26 VITALS
OXYGEN SATURATION: 98 % | HEART RATE: 48 BPM | RESPIRATION RATE: 16 BRPM | DIASTOLIC BLOOD PRESSURE: 80 MMHG | BODY MASS INDEX: 30.38 KG/M2 | SYSTOLIC BLOOD PRESSURE: 136 MMHG | WEIGHT: 212.2 LBS | TEMPERATURE: 97.2 F | HEIGHT: 70 IN

## 2020-02-26 DIAGNOSIS — R31.29 MICROSCOPIC HEMATURIA: ICD-10-CM

## 2020-02-26 DIAGNOSIS — Z13.220 SCREENING FOR HYPERLIPIDEMIA: ICD-10-CM

## 2020-02-26 DIAGNOSIS — Z13.29 SCREENING FOR HYPOTHYROIDISM: ICD-10-CM

## 2020-02-26 DIAGNOSIS — Z00.00 ROUTINE GENERAL MEDICAL EXAMINATION AT A HEALTH CARE FACILITY: Primary | ICD-10-CM

## 2020-02-26 DIAGNOSIS — Z13.6 SCREENING FOR HYPERTENSION: ICD-10-CM

## 2020-02-26 DIAGNOSIS — Z12.5 ENCOUNTER FOR PROSTATE CANCER SCREENING: ICD-10-CM

## 2020-02-26 DIAGNOSIS — Z12.11 SCREENING FOR COLON CANCER: ICD-10-CM

## 2020-02-26 DIAGNOSIS — Z82.49 FAMILY HISTORY OF EARLY CAD: ICD-10-CM

## 2020-02-26 LAB
SL AMB  POCT GLUCOSE, UA: 0
SL AMB LEUKOCYTE ESTERASE,UA: 0
SL AMB POCT BILIRUBIN,UA: 0
SL AMB POCT BLOOD,UA: 50
SL AMB POCT CLARITY,UA: CLEAR
SL AMB POCT COLOR,UA: YELLOW
SL AMB POCT FECES OCC BLD: NORMAL
SL AMB POCT KETONES,UA: 0
SL AMB POCT NITRITE,UA: 0
SL AMB POCT PH,UA: 5
SL AMB POCT SPECIFIC GRAVITY,UA: 1.02
SL AMB POCT URINE PROTEIN: 0
SL AMB POCT UROBILINOGEN: 0

## 2020-02-26 PROCEDURE — 81003 URINALYSIS AUTO W/O SCOPE: CPT | Performed by: FAMILY MEDICINE

## 2020-02-26 PROCEDURE — 3008F BODY MASS INDEX DOCD: CPT | Performed by: FAMILY MEDICINE

## 2020-02-26 PROCEDURE — 99396 PREV VISIT EST AGE 40-64: CPT | Performed by: FAMILY MEDICINE

## 2020-02-26 PROCEDURE — 82270 OCCULT BLOOD FECES: CPT | Performed by: FAMILY MEDICINE

## 2020-02-26 PROCEDURE — 93000 ELECTROCARDIOGRAM COMPLETE: CPT | Performed by: FAMILY MEDICINE

## 2020-02-26 NOTE — PATIENT INSTRUCTIONS
DISCUSSED HEALTH MAINTENENCE ISSUES  BW WILL BE OBTAINED  ENCOURAGED HEALTHY DIET AND EXERCISE  COLONOSCOPY WILL BE ORDERED  RV FOR ANNUAL HEALTH EXAM IN 1 YEAR  RV SOONER IF THERE ARE ANY CONCERNS    Recent Results (from the past 672 hour(s))   TSH, 3rd generation    Collection Time: 02/19/20  8:44 AM   Result Value Ref Range    TSH 1 300 0 450 - 4 500 uIU/mL   CBC and differential    Collection Time: 02/19/20  8:44 AM   Result Value Ref Range    White Blood Cell Count 5 0 3 4 - 10 8 x10E3/uL    Red Blood Cell Count 4 67 4 14 - 5 80 x10E6/uL    Hemoglobin 14 8 13 0 - 17 7 g/dL    HCT 45 8 37 5 - 51 0 %    MCV 98 (H) 79 - 97 fL    MCH 31 7 26 6 - 33 0 pg    MCHC 32 3 31 5 - 35 7 g/dL    RDW 13 9 11 6 - 15 4 %    Platelet Count 452 552 - 450 x10E3/uL    Neutrophils 58 Not Estab  %    Lymphocytes 32 Not Estab  %    Monocytes 7 Not Estab  %    Eosinophils 2 Not Estab  %    Basophils PCT 1 Not Estab  %    Neutrophils (Absolute) 2 9 1 4 - 7 0 x10E3/uL    Lymphocytes (Absolute) 1 6 0 7 - 3 1 x10E3/uL    Monocytes (Absolute) 0 4 0 1 - 0 9 x10E3/uL    Eosinophils (Absolute) 0 1 0 0 - 0 4 x10E3/uL    Basophils ABS 0 0 0 0 - 0 2 x10E3/uL    Immature Granulocytes 0 Not Estab  %    Immature Granulocytes (Absolute) 0 0 0 0 - 0 1 x10E3/uL   Comprehensive metabolic panel    Collection Time: 02/19/20  8:44 AM   Result Value Ref Range    Glucose, Random 99 65 - 99 mg/dL    BUN 15 6 - 24 mg/dL    Creatinine 0 86 0 76 - 1 27 mg/dL    eGFR Non  98 >59 mL/min/1 73    eGFR  113 >59 mL/min/1 73    SL AMB BUN/CREATININE RATIO 17 9 - 20    Sodium 143 134 - 144 mmol/L    Potassium 4 8 3 5 - 5 2 mmol/L    Chloride 103 96 - 106 mmol/L    CO2 25 20 - 29 mmol/L    CALCIUM 9 5 8 7 - 10 2 mg/dL    Protein, Total 6 9 6 0 - 8 5 g/dL    Albumin 4 3 3 8 - 4 9 g/dL    Globulin, Total 2 6 1 5 - 4 5 g/dL    Albumin/Globulin Ratio 1 7 1 2 - 2 2    TOTAL BILIRUBIN 0 6 0 0 - 1 2 mg/dL    Alk Phos Isoenzymes 77 39 - 117 IU/L AST 20 0 - 40 IU/L    ALT 27 0 - 44 IU/L   Lipid panel    Collection Time: 02/19/20  8:44 AM   Result Value Ref Range    Cholesterol, Total 198 100 - 199 mg/dL    Triglycerides 73 0 - 149 mg/dL    HDL 54 >39 mg/dL    VLDL Cholesterol Calculated 15 5 - 40 mg/dL    LDL Direct 129 (H) 0 - 99 mg/dL    T   Chol/HDL Ratio 3 7 0 0 - 5 0 ratio   PSA Total (Reflex To Free)    Collection Time: 02/19/20  8:44 AM   Result Value Ref Range    Prostate Specific Antigen Total 0 6 0 0 - 4 0 ng/mL    Reflex Criteria Comment    Human Immunodeficiency Virus 1/2 Antigen / Antibody ( Fourth Generation) with Reflex Testing    Collection Time: 02/19/20  8:44 AM   Result Value Ref Range    HIV Screen 4th Generation wRflx Non Reactive Non Reactive   Hepatitis C antibody    Collection Time: 02/19/20  8:44 AM   Result Value Ref Range    HEP C AB <0 1 0 0 - 0 9 s/co ratio

## 2020-02-26 NOTE — LETTER
[address] [place] [address]    Patient Active Problem List   Diagnosis    Erectile dysfunction    Encounter for hepatitis C screening test for low risk patient    Screening for HIV (human immunodeficiency virus)    Encounter for prostate cancer screening    Screening for hypothyroidism    Screening for hyperlipidemia    Screening for hypertension    Routine general medical examination at a health care facility         Current Outpatient Medications:     tadalafil (CIALIS) 20 MG tablet, Take 20 mg by mouth daily as needed for erectile dysfunction, Disp: , Rfl:       Recent Results (from the past 672 hour(s))   TSH, 3rd generation    Collection Time: 02/19/20  8:44 AM   Result Value Ref Range    TSH 1 300 0 450 - 4 500 uIU/mL   CBC and differential    Collection Time: 02/19/20  8:44 AM   Result Value Ref Range    White Blood Cell Count 5 0 3 4 - 10 8 x10E3/uL    Red Blood Cell Count 4 67 4 14 - 5 80 x10E6/uL    Hemoglobin 14 8 13 0 - 17 7 g/dL    HCT 45 8 37 5 - 51 0 %    MCV 98 (H) 79 - 97 fL    MCH 31 7 26 6 - 33 0 pg    MCHC 32 3 31 5 - 35 7 g/dL    RDW 13 9 11 6 - 15 4 %    Platelet Count 122 899 - 450 x10E3/uL    Neutrophils 58 Not Estab  %    Lymphocytes 32 Not Estab  %    Monocytes 7 Not Estab  %    Eosinophils 2 Not Estab  %    Basophils PCT 1 Not Estab  %    Neutrophils (Absolute) 2 9 1 4 - 7 0 x10E3/uL    Lymphocytes (Absolute) 1 6 0 7 - 3 1 x10E3/uL    Monocytes (Absolute) 0 4 0 1 - 0 9 x10E3/uL    Eosinophils (Absolute) 0 1 0 0 - 0 4 x10E3/uL    Basophils ABS 0 0 0 0 - 0 2 x10E3/uL    Immature Granulocytes 0 Not Estab  %    Immature Granulocytes (Absolute) 0 0 0 0 - 0 1 x10E3/uL   Comprehensive metabolic panel    Collection Time: 02/19/20  8:44 AM   Result Value Ref Range    Glucose, Random 99 65 - 99 mg/dL    BUN 15 6 - 24 mg/dL    Creatinine 0 86 0 76 - 1 27 mg/dL    eGFR Non  98 >59 mL/min/1 73    eGFR  113 >59 mL/min/1 73    SL AMB BUN/CREATININE RATIO 17 9 - 20    Sodium 143 134 - 144 mmol/L    Potassium 4 8 3 5 - 5 2 mmol/L    Chloride 103 96 - 106 mmol/L    CO2 25 20 - 29 mmol/L    CALCIUM 9 5 8 7 - 10 2 mg/dL    Protein, Total 6 9 6 0 - 8 5 g/dL    Albumin 4 3 3 8 - 4 9 g/dL    Globulin, Total 2 6 1 5 - 4 5 g/dL    Albumin/Globulin Ratio 1 7 1 2 - 2 2    TOTAL BILIRUBIN 0 6 0 0 - 1 2 mg/dL    Alk Phos Isoenzymes 77 39 - 117 IU/L    AST 20 0 - 40 IU/L    ALT 27 0 - 44 IU/L   Lipid panel    Collection Time: 02/19/20  8:44 AM   Result Value Ref Range    Cholesterol, Total 198 100 - 199 mg/dL    Triglycerides 73 0 - 149 mg/dL    HDL 54 >39 mg/dL    VLDL Cholesterol Calculated 15 5 - 40 mg/dL    LDL Direct 129 (H) 0 - 99 mg/dL    T   Chol/HDL Ratio 3 7 0 0 - 5 0 ratio   PSA Total (Reflex To Free)    Collection Time: 02/19/20  8:44 AM   Result Value Ref Range    Prostate Specific Antigen Total 0 6 0 0 - 4 0 ng/mL    Reflex Criteria Comment    Human Immunodeficiency Virus 1/2 Antigen / Antibody ( Fourth Generation) with Reflex Testing    Collection Time: 02/19/20  8:44 AM   Result Value Ref Range    HIV Screen 4th Generation wRflx Non Reactive Non Reactive   Hepatitis C antibody    Collection Time: 02/19/20  8:44 AM   Result Value Ref Range    HEP C AB <0 1 0 0 - 0 9 s/co ratio

## 2020-02-26 NOTE — PROGRESS NOTES
BMI Counseling: Body mass index is 30 89 kg/m²  The BMI is above normal  Nutrition recommendations include encouraging healthy choices of fruits and vegetables and moderation in carbohydrate intake  Exercise recommendations include exercising 3-5 times per week  No pharmacotherapy was ordered  FAMILY PRACTICE HEALTH MAINTENANCE OFFICE VISIT  [facility] [place] [address] PHYSICIANS    NAME: Steven Kaufman  AGE: [address] [place] y [place]  SEX: male  : 1965     DATE: 2020    Assessment and Plan     Problem List Items Addressed This Visit        Other    Encounter for prostate cancer screening    Screening for hypothyroidism    Screening for hyperlipidemia    Screening for hypertension    Relevant Orders    POCT urine dip auto non-scope (Completed)    POCT ECG (Completed)    Routine general medical examination at a health care facility - Primary      Other Visit Diagnoses     Screening for colon cancer        Relevant Orders    POCT hemoccult screening (Completed)    Family history of early CAD        Relevant Orders    CT coronary calcium score    Microscopic hematuria        Relevant Orders    Cytology, urine               Return in about 1 year (around 2021) for Annual physical         Chief Complaint     Chief Complaint   Patient presents with    Annual Exam       History of Present Illness     [facility] [address] RECORD  NO CONCERNS AT THIS TIME        Well Adult Physical   Patient here for a comprehensive physical exam       Diet and Physical Activity  Diet: well balanced diet  Weight concerns: Patient has class 1 obesity (BMI 30-34  9)  Exercise: intermittently      Depression Screen  PHQ-9 Depression Screening    PHQ-9:    Frequency of the following problems over the past two weeks:       Little interest or pleasure in doing things:  0 - not at all  Feeling down, depressed, or hopeless:  0 - not at all  PHQ-2 Score:  0          General Health  Hearing: Normal:  bilateral  Vision: no vision problems  Dental: regular dental visits    Reproductive Health          The following portions of the patient's history were reviewed and updated as appropriate: allergies, current medications, past family history, past medical history, past social history, past surgical history and problem list     Review of Systems     Review of Systems   Constitutional: Negative for chills, fatigue and fever  HENT: Negative for congestion, ear discharge, ear pain, mouth sores, postnasal drip, sore throat and trouble swallowing  Eyes: Negative for pain, discharge and visual disturbance  Respiratory: Negative for cough, shortness of breath and wheezing  Cardiovascular: Negative for chest pain, palpitations and leg swelling  Gastrointestinal: Negative for abdominal distention, abdominal pain, blood in stool, diarrhea and nausea  Endocrine: Negative for polydipsia, polyphagia and polyuria  Genitourinary: Negative for dysuria, frequency, hematuria and urgency  Musculoskeletal: Negative for arthralgias, gait problem and joint swelling  Skin: Negative for pallor and rash  Neurological: Negative for dizziness, syncope, speech difficulty, weakness, light-headedness, numbness and headaches  Hematological: Negative for adenopathy  Psychiatric/Behavioral: Negative for behavioral problems, confusion and sleep disturbance  The patient is not nervous/anxious  Past Medical History     History reviewed  No pertinent past medical history      Past Surgical History     Past Surgical History:   Procedure Laterality Date    ADENOIDECTOMY      APPENDECTOMY      GALLBLADDER SURGERY         Social History     Social History     Socioeconomic History    Marital status: /Civil Union     Spouse name: None    Number of children: None    Years of education: None    Highest education level: None   Occupational History    None   Social Needs    Financial resource strain: None  Food insecurity:     Worry: None     Inability: None    Transportation needs:     Medical: None     Non-medical: None   Tobacco Use    Smoking status: Never Smoker    Smokeless tobacco: Never Used   Substance and Sexual Activity    Alcohol use: Yes     Comment: Minimum alcohol consumption    Drug use: No    Sexual activity: None   Lifestyle    Physical activity:     Days per week: None     Minutes per session: None    Stress: None   Relationships    Social connections:     Talks on phone: None     Gets together: None     Attends Jew service: None     Active member of club or organization: None     Attends meetings of clubs or organizations: None     Relationship status: None    Intimate partner violence:     Fear of current or ex partner: None     Emotionally abused: None     Physically abused: None     Forced sexual activity: None   Other Topics Concern    None   Social History Narrative    Caffeine use    Drinks coffee    Patient ingests cola containing caffeine       Family History     Family History   Problem Relation Age of Onset    Coronary artery disease Father 70        CAD involving CABG of native heart with other forms of angina pectoris    Hypertension Father         Essential Hypertension    Heart attack Father         Myocardial Infarction involving other coronary artery    Heart attack Cousin         Myocardial Infarction involving other coronary artery    Mental illness Neg Hx     Substance Abuse Neg Hx        Current Medications       Current Outpatient Medications:     tadalafil (CIALIS) 20 MG tablet, Take 20 mg by mouth daily as needed for erectile dysfunction, Disp: , Rfl:      Allergies     No Known Allergies    Objective     /80   Pulse (!) 48   Temp (!) 97 2 °F (36 2 °C) (Temporal)   Resp 16   Ht 5' 9 5" (1 765 m)   Wt 96 3 kg (212 lb 3 2 oz)   SpO2 98%   BMI 30 89 kg/m²      Physical Exam   Constitutional: He is oriented to person, place, and time   He appears well-developed and well-nourished  HENT:   Head: Normocephalic and atraumatic  Right Ear: External ear normal    Left Ear: External ear normal    Nose: Nose normal    Mouth/Throat: Oropharynx is clear and moist    Eyes: Pupils are equal, round, and reactive to light  Conjunctivae and EOM are normal  Right eye exhibits no discharge  Left eye exhibits no discharge  Neck: Neck supple  No JVD present  No thyromegaly present  Cardiovascular: Normal rate, regular rhythm, normal heart sounds and intact distal pulses  No murmur heard  Pulmonary/Chest: Effort normal and breath sounds normal  He has no wheezes  He has no rales  Abdominal: Soft  Bowel sounds are normal  He exhibits no mass  There is no hepatosplenomegaly  There is no tenderness  There is no rebound, no guarding and no CVA tenderness  Genitourinary: Rectum normal, prostate normal and penis normal  Rectal exam shows guaiac negative stool  Musculoskeletal: Normal range of motion  He exhibits no edema, tenderness or deformity  Lymphadenopathy:     He has no cervical adenopathy  He has no axillary adenopathy  Neurological: He is alert and oriented to person, place, and time  He has normal reflexes  No cranial nerve deficit  He exhibits normal muscle tone  Coordination normal    Skin: Skin is warm and dry  No rash noted  No erythema  Psychiatric: He has a normal mood and affect   His behavior is normal  Judgment and thought content normal          No exam data present    Health Maintenance     Health Maintenance   Topic Date Due    BMI: Followup Plan  04/22/1982    Annual Physical  07/02/2019    Influenza Vaccine  03/24/2020 (Originally 7/1/2019)    Depression Screening PHQ  02/26/2021    BMI: Adult  02/26/2021    CRC Screening: Colonoscopy  09/26/2022    DTaP,Tdap,and Td Vaccines (2 - Td) 07/02/2028    Pneumococcal Vaccine: 65+ Years (1 of 2 - PCV13) 04/22/2029    HIV Screening  Completed    Hepatitis C Screening Completed    Pneumococcal Vaccine: Pediatrics (0 to 5 Years) and At-Risk Patients (6 to 59 Years)  Aged Out    HIB Vaccine  Aged Out    Hepatitis B Vaccine  Aged Out    IPV Vaccine  Aged Out    Hepatitis A Vaccine  Aged Out    Meningococcal ACWY Vaccine  Aged Out    HPV Vaccine  Aged [employer] History   Administered Date(s) Administered    Tdap 07/02/2018       Yosef Joshi MD  [facility] [place]

## 2020-02-28 LAB
COUNSELING NOTE: NORMAL
CYTOLOGIST CVX/VAG CYTO: NORMAL
DX ICD CODE: NORMAL
PATH REPORT.FINAL DX SPEC: NORMAL
PATH REPORT.GROSS SPEC: NORMAL
PATH REPORT.SITE OF ORIGIN SPEC: NORMAL
PATHOLOGIST NAME: NORMAL

## 2020-03-05 ENCOUNTER — TELEPHONE (OUTPATIENT)
Dept: FAMILY MEDICINE CLINIC | Facility: CLINIC | Age: 55
End: 2020-03-05

## 2020-03-05 DIAGNOSIS — N52.9 ERECTILE DYSFUNCTION, UNSPECIFIED ERECTILE DYSFUNCTION TYPE: Primary | ICD-10-CM

## 2020-03-05 RX ORDER — TADALAFIL 20 MG/1
20 TABLET ORAL DAILY PRN
Qty: 10 TABLET | Refills: 1 | Status: SHIPPED | OUTPATIENT
Start: 2020-03-05 | End: 2021-05-19

## 2020-03-06 NOTE — TELEPHONE ENCOUNTER
Patient stated that he spoke to you regarding prescribing Cialis for him  He called the pharmacy and nothing was sent in      Uses SR in [facility]

## 2020-12-11 ENCOUNTER — VBI (OUTPATIENT)
Dept: ADMINISTRATIVE | Facility: OTHER | Age: 55
End: 2020-12-11

## 2020-12-31 DIAGNOSIS — Z13.220 SCREENING FOR HYPERLIPIDEMIA: ICD-10-CM

## 2020-12-31 DIAGNOSIS — Z00.00 ROUTINE GENERAL MEDICAL EXAMINATION AT A HEALTH CARE FACILITY: Primary | ICD-10-CM

## 2020-12-31 DIAGNOSIS — Z13.29 SCREENING FOR HYPOTHYROIDISM: ICD-10-CM

## 2020-12-31 DIAGNOSIS — Z13.6 SCREENING FOR HYPERTENSION: ICD-10-CM

## 2020-12-31 DIAGNOSIS — Z12.5 ENCOUNTER FOR PROSTATE CANCER SCREENING: ICD-10-CM

## 2021-04-08 LAB
ALBUMIN SERPL-MCNC: 4.2 G/DL (ref 3.8–4.9)
ALBUMIN/GLOB SERPL: 1.6 {RATIO} (ref 1.2–2.2)
ALP SERPL-CCNC: 80 IU/L (ref 39–117)
ALT SERPL-CCNC: 20 IU/L (ref 0–44)
AST SERPL-CCNC: 17 IU/L (ref 0–40)
BASOPHILS # BLD AUTO: 0 X10E3/UL (ref 0–0.2)
BASOPHILS NFR BLD AUTO: 1 %
BILIRUB SERPL-MCNC: 0.3 MG/DL (ref 0–1.2)
BUN SERPL-MCNC: 16 MG/DL (ref 6–24)
BUN/CREAT SERPL: 19 (ref 9–20)
CALCIUM SERPL-MCNC: 9.1 MG/DL (ref 8.7–10.2)
CHLORIDE SERPL-SCNC: 104 MMOL/L (ref 96–106)
CHOLEST SERPL-MCNC: 195 MG/DL (ref 100–199)
CHOLEST/HDLC SERPL: 3.9 RATIO (ref 0–5)
CO2 SERPL-SCNC: 26 MMOL/L (ref 20–29)
CREAT SERPL-MCNC: 0.83 MG/DL (ref 0.76–1.27)
EOSINOPHIL # BLD AUTO: 0.1 X10E3/UL (ref 0–0.4)
EOSINOPHIL NFR BLD AUTO: 2 %
ERYTHROCYTE [DISTWIDTH] IN BLOOD BY AUTOMATED COUNT: 12.4 % (ref 11.6–15.4)
GLOBULIN SER-MCNC: 2.6 G/DL (ref 1.5–4.5)
GLUCOSE SERPL-MCNC: 87 MG/DL (ref 65–99)
HCT VFR BLD AUTO: 45.4 % (ref 37.5–51)
HDLC SERPL-MCNC: 50 MG/DL
HGB BLD-MCNC: 14.9 G/DL (ref 13–17.7)
IMM GRANULOCYTES # BLD: 0 X10E3/UL (ref 0–0.1)
IMM GRANULOCYTES NFR BLD: 0 %
LDLC SERPL CALC-MCNC: 126 MG/DL (ref 0–99)
LYMPHOCYTES # BLD AUTO: 1.9 X10E3/UL (ref 0.7–3.1)
LYMPHOCYTES NFR BLD AUTO: 37 %
MCH RBC QN AUTO: 31.1 PG (ref 26.6–33)
MCHC RBC AUTO-ENTMCNC: 32.8 G/DL (ref 31.5–35.7)
MCV RBC AUTO: 95 FL (ref 79–97)
MONOCYTES # BLD AUTO: 0.6 X10E3/UL (ref 0.1–0.9)
MONOCYTES NFR BLD AUTO: 11 %
NEUTROPHILS # BLD AUTO: 2.6 X10E3/UL (ref 1.4–7)
NEUTROPHILS NFR BLD AUTO: 49 %
PLATELET # BLD AUTO: 234 X10E3/UL (ref 150–450)
POTASSIUM SERPL-SCNC: 4.1 MMOL/L (ref 3.5–5.2)
PROT SERPL-MCNC: 6.8 G/DL (ref 6–8.5)
RBC # BLD AUTO: 4.79 X10E6/UL (ref 4.14–5.8)
SL AMB EGFR AFRICAN AMERICAN: 114 ML/MIN/1.73
SL AMB EGFR NON AFRICAN AMERICAN: 98 ML/MIN/1.73
SL AMB VLDL CHOLESTEROL CALC: 19 MG/DL (ref 5–40)
SODIUM SERPL-SCNC: 141 MMOL/L (ref 134–144)
TRIGL SERPL-MCNC: 105 MG/DL (ref 0–149)
TSH SERPL DL<=0.005 MIU/L-ACNC: 1.93 UIU/ML (ref 0.45–4.5)
WBC # BLD AUTO: 5.3 X10E3/UL (ref 3.4–10.8)

## 2021-04-09 LAB
PSA SERPL-MCNC: 0.6 NG/ML (ref 0–4)
SL AMB REFLEX CRITERIA: NORMAL

## 2021-04-12 ENCOUNTER — OFFICE VISIT (OUTPATIENT)
Dept: FAMILY MEDICINE CLINIC | Facility: CLINIC | Age: 56
End: 2021-04-12
Payer: COMMERCIAL

## 2021-04-12 VITALS
HEART RATE: 58 BPM | RESPIRATION RATE: 16 BRPM | HEIGHT: 69 IN | BODY MASS INDEX: 31.25 KG/M2 | SYSTOLIC BLOOD PRESSURE: 128 MMHG | TEMPERATURE: 97.5 F | WEIGHT: 211 LBS | OXYGEN SATURATION: 98 % | DIASTOLIC BLOOD PRESSURE: 74 MMHG

## 2021-04-12 DIAGNOSIS — Z13.220 SCREENING FOR HYPERLIPIDEMIA: ICD-10-CM

## 2021-04-12 DIAGNOSIS — Z13.29 SCREENING FOR HYPOTHYROIDISM: ICD-10-CM

## 2021-04-12 DIAGNOSIS — Z12.11 COLON CANCER SCREENING: ICD-10-CM

## 2021-04-12 DIAGNOSIS — Z13.6 SCREENING FOR HYPERTENSION: ICD-10-CM

## 2021-04-12 DIAGNOSIS — Z82.49 FAMILY HISTORY OF ATHEROSCLEROSIS: ICD-10-CM

## 2021-04-12 DIAGNOSIS — Z00.00 ROUTINE GENERAL MEDICAL EXAMINATION AT A HEALTH CARE FACILITY: Primary | ICD-10-CM

## 2021-04-12 DIAGNOSIS — Z12.5 ENCOUNTER FOR PROSTATE CANCER SCREENING: ICD-10-CM

## 2021-04-12 PROBLEM — Z11.4 SCREENING FOR HIV (HUMAN IMMUNODEFICIENCY VIRUS): Status: RESOLVED | Noted: 2020-02-17 | Resolved: 2021-04-12

## 2021-04-12 PROBLEM — Z11.59 ENCOUNTER FOR HEPATITIS C SCREENING TEST FOR LOW RISK PATIENT: Status: RESOLVED | Noted: 2020-02-17 | Resolved: 2021-04-12

## 2021-04-12 LAB — SL AMB POCT FECES OCC BLD: NORMAL

## 2021-04-12 PROCEDURE — 99396 PREV VISIT EST AGE 40-64: CPT | Performed by: FAMILY MEDICINE

## 2021-04-12 PROCEDURE — 93000 ELECTROCARDIOGRAM COMPLETE: CPT | Performed by: FAMILY MEDICINE

## 2021-04-12 PROCEDURE — 82270 OCCULT BLOOD FECES: CPT | Performed by: FAMILY MEDICINE

## 2021-04-12 PROCEDURE — 3008F BODY MASS INDEX DOCD: CPT | Performed by: FAMILY MEDICINE

## 2021-04-12 PROCEDURE — 3725F SCREEN DEPRESSION PERFORMED: CPT | Performed by: FAMILY MEDICINE

## 2021-04-12 NOTE — PROGRESS NOTES
BMI Counseling: Body mass index is 31 62 kg/m²  The BMI is above normal  Nutrition recommendations include encouraging healthy choices of fruits and vegetables and moderation in carbohydrate intake  Exercise recommendations include exercising 3-5 times per week  No pharmacotherapy was ordered  FAMILY [facility] HEALTH MAINTENANCE OFFICE VISIT  [facility] - [place] [address] PHYSICIANS    NAME: Jovani Ayala  AGE: 63 y o  SEX: male  : 1965     DATE: 2021    Assessment and Plan     Problem List Items Addressed This Visit        Other    Encounter for prostate cancer screening    Screening for hypothyroidism    Screening for hyperlipidemia    Screening for hypertension    Relevant Orders    POCT ECG (Completed)    Colon cancer screening - Primary    Relevant Orders    POCT hemoccult screening      Other Visit Diagnoses     Family history of atherosclerosis        Relevant Orders    CT coronary calcium score               Return in about 1 year (around 2022) for Annual physical         Chief Complaint     Chief Complaint   Patient presents with    Physical Exam     [facility]       History of Present Illness     [address] [place] [address] Rd RECORD  NO CONCERNS AT THIS TIME        Well Adult Physical   Patient here for a comprehensive physical exam       Diet and Physical Activity  Diet: well balanced diet  Weight concerns: Patient has class 1 obesity (BMI 30-34  9)  Exercise: frequently      Depression Screen  PHQ-9 Depression Screening    PHQ-9:   Frequency of the following problems over the past two weeks:      Little interest or pleasure in doing things: 0 - not at all  Feeling down, depressed, or hopeless: 0 - not at all  PHQ-2 Score: 0          General Health  Hearing: Normal:  bilateral  Vision: no vision problems  Dental: regular dental visits    Reproductive Health          The following portions of the patient's history were reviewed and updated as appropriate: allergies, current medications, past family history, past medical history, past social history, past surgical history and problem list     Review of Systems     Review of Systems   Constitutional: Negative for chills, fatigue and fever  HENT: Negative for congestion, ear discharge, ear pain, mouth sores, postnasal drip, sore throat and trouble swallowing  Eyes: Negative for pain, discharge and visual disturbance  Respiratory: Negative for cough, shortness of breath and wheezing  Cardiovascular: Negative for chest pain, palpitations and leg swelling  Gastrointestinal: Negative for abdominal distention, abdominal pain, blood in stool, diarrhea and nausea  Endocrine: Negative for polydipsia, polyphagia and polyuria  Genitourinary: Negative for dysuria, frequency, hematuria and urgency  Musculoskeletal: Negative for arthralgias, gait problem and joint swelling  Skin: Negative for pallor and rash  Neurological: Negative for dizziness, syncope, speech difficulty, weakness, light-headedness, numbness and headaches  Hematological: Negative for adenopathy  Psychiatric/Behavioral: Negative for behavioral problems, confusion and sleep disturbance  The patient is not nervous/anxious  Past Medical History     History reviewed  No pertinent past medical history      Past Surgical History     Past Surgical History:   Procedure Laterality Date    ADENOIDECTOMY      APPENDECTOMY      GALLBLADDER SURGERY         Social History     Social History     Socioeconomic History    Marital status: /Civil Union     Spouse name: None    Number of children: None    Years of education: None    Highest education level: None   Occupational History    None   Social Needs    Financial resource strain: None    Food insecurity     Worry: None     Inability: None    Transportation needs     Medical: None     Non-medical: None   Tobacco Use    Smoking status: Never Smoker    Smokeless tobacco: Never Used   Substance and Sexual Activity    Alcohol use: Yes     Comment: Minimum alcohol consumption    Drug use: No    Sexual activity: None   Lifestyle    Physical activity     Days per week: None     Minutes per session: None    Stress: None   Relationships    Social connections     Talks on phone: None     Gets together: None     Attends Yazidism service: None     Active member of club or organization: None     Attends meetings of clubs or organizations: None     Relationship status: None    Intimate partner violence     Fear of current or ex partner: None     Emotionally abused: None     Physically abused: None     Forced sexual activity: None   Other Topics Concern    None   Social History Narrative    Caffeine use    Drinks coffee    Patient ingests cola containing caffeine       Family History     Family History   Problem Relation Age of Onset    Coronary artery disease Father 70        CAD involving CABG of native heart with other forms of angina pectoris    Hypertension Father         Essential Hypertension    Heart attack Father         Myocardial Infarction involving other coronary artery    Heart attack Cousin         Myocardial Infarction involving other coronary artery    Mental illness Neg Hx     Substance Abuse Neg Hx        Current Medications       Current Outpatient Medications:     tadalafil (CIALIS) 20 MG tablet, Take 1 tablet (20 mg total) by mouth daily as needed for erectile dysfunction (Patient not taking: Reported on 4/12/2021), Disp: 10 tablet, Rfl: 1     Allergies     No Known Allergies    Objective     /74   Pulse 58   Temp 97 5 °F (36 4 °C)   Resp 16   Ht 5' 8 5" (1 74 m)   Wt 95 7 kg (211 lb)   SpO2 98%   BMI 31 62 kg/m²      Physical Exam  Constitutional:       Appearance: He is well-developed  HENT:      Head: Normocephalic and atraumatic        Right Ear: External ear normal       Left Ear: External ear normal       Nose: Nose normal  Eyes:      General:         Right eye: No discharge  Left eye: No discharge  Conjunctiva/sclera: Conjunctivae normal       Pupils: Pupils are equal, round, and reactive to light  Neck:      Musculoskeletal: Neck supple  Thyroid: No thyromegaly  Vascular: No JVD  Cardiovascular:      Rate and Rhythm: Normal rate and regular rhythm  Heart sounds: Normal heart sounds  No murmur  Pulmonary:      Effort: Pulmonary effort is normal       Breath sounds: Normal breath sounds  No wheezing or rales  Abdominal:      General: Bowel sounds are normal       Palpations: Abdomen is soft  There is no mass  Tenderness: There is no abdominal tenderness  There is no guarding or rebound  Genitourinary:     Penis: Normal        Prostate: Normal       Rectum: Normal  Guaiac result negative  Musculoskeletal: Normal range of motion  General: No tenderness or deformity  Lymphadenopathy:      Cervical: No cervical adenopathy  Skin:     General: Skin is warm and dry  Findings: No erythema or rash  Neurological:      Mental Status: He is alert and oriented to person, place, and time  Cranial Nerves: No cranial nerve deficit  Motor: No abnormal muscle tone  Coordination: Coordination normal       Deep Tendon Reflexes: Reflexes are normal and symmetric  Psychiatric:         Behavior: Behavior normal          Thought Content:  Thought content normal          Judgment: Judgment normal            No exam data present    Health Maintenance     Health Maintenance   Topic Date Due    Influenza Vaccine (1) Never done    BMI: Followup Plan  02/26/2021    Annual Physical  02/26/2021    COVID-19 Vaccine (2 - Pfizer 2-dose series) 04/24/2021    Depression Screening PHQ  04/12/2022    BMI: Adult  04/12/2022    Colorectal Cancer Screening  09/26/2022    DTaP,Tdap,and Td Vaccines (2 - Td) 07/02/2028    HIV Screening  Completed    Hepatitis C Screening  Completed    Pneumococcal Vaccine: Pediatrics (0 to 5 Years) and At-Risk Patients (6 to 59 Years)  Aged Out    HIB Vaccine  Aged Out    Hepatitis B Vaccine  Aged Out    IPV Vaccine  Aged Out    Hepatitis A Vaccine  Aged Out    Meningococcal ACWY Vaccine  Aged Out    HPV Vaccine  Aged [employer] History   Administered Date(s) Administered    SARS-CoV-2 / COVID-19 mRNA IM (Pfizer-BioNTech) 04/03/2021    Tdap 07/02/2018       Harper Rodriguez MD  [facility]

## 2021-04-12 NOTE — LETTER
JAY CAPELLAN      Current Outpatient Medications:     tadalafil (CIALIS) 20 MG tablet, Take 1 tablet (20 mg total) by mouth daily as needed for erectile dysfunction (Patient not taking: Reported on 4/12/2021), Disp: 10 tablet, Rfl: 1      Recent Results (from the past 672 hour(s))   CBC and differential    Collection Time: 04/08/21  7:19 AM   Result Value Ref Range    White Blood Cell Count 5 3 3 4 - 10 8 x10E3/uL    Red Blood Cell Count 4 79 4 14 - 5 80 x10E6/uL    Hemoglobin 14 9 13 0 - 17 7 g/dL    HCT 45 4 37 5 - 51 0 %    MCV 95 79 - 97 fL    MCH 31 1 26 6 - 33 0 pg    MCHC 32 8 31 5 - 35 7 g/dL    RDW 12 4 11 6 - 15 4 %    Platelet Count 831 437 - 450 x10E3/uL    Neutrophils 49 Not Estab  %    Lymphocytes 37 Not Estab  %    Monocytes 11 Not Estab  %    Eosinophils 2 Not Estab  %    Basophils PCT 1 Not Estab  %    Neutrophils (Absolute) 2 6 1 4 - 7 0 x10E3/uL    Lymphocytes (Absolute) 1 9 0 7 - 3 1 x10E3/uL    Monocytes (Absolute) 0 6 0 1 - 0 9 x10E3/uL    Eosinophils (Absolute) 0 1 0 0 - 0 4 x10E3/uL    Basophils ABS 0 0 0 0 - 0 2 x10E3/uL    Immature Granulocytes 0 Not Estab  %    Immature Granulocytes (Absolute) 0 0 0 0 - 0 1 x10E3/uL   Comprehensive metabolic panel    Collection Time: 04/08/21  7:19 AM   Result Value Ref Range    Glucose, Random 87 65 - 99 mg/dL    BUN 16 6 - 24 mg/dL    Creatinine 0 83 0 76 - 1 27 mg/dL    eGFR Non  98 >59 mL/min/1 73    eGFR  114 >59 mL/min/1 73    SL AMB BUN/CREATININE RATIO 19 9 - 20    Sodium 141 134 - 144 mmol/L    Potassium 4 1 3 5 - 5 2 mmol/L    Chloride 104 96 - 106 mmol/L    CO2 26 20 - 29 mmol/L    CALCIUM 9 1 8 7 - 10 2 mg/dL    Protein, Total 6 8 6 0 - 8 5 g/dL    Albumin 4 2 3 8 - 4 9 g/dL    Globulin, Total 2 6 1 5 - 4 5 g/dL    Albumin/Globulin Ratio 1 6 1 2 - 2 2    TOTAL BILIRUBIN 0 3 0 0 - 1 2 mg/dL    Alk Phos Isoenzymes 80 39 - 117 IU/L    AST 17 0 - 40 IU/L    ALT 20 0 - 44 IU/L   Lipid panel    Collection Time: 04/08/21  7:19 AM   Result Value Ref Range    Cholesterol, Total 195 100 - 199 mg/dL    Triglycerides 105 0 - 149 mg/dL    HDL 50 >39 mg/dL    VLDL Cholesterol Calculated 19 5 - 40 mg/dL    LDL Calculated 126 (H) 0 - 99 mg/dL    T   Chol/HDL Ratio 3 9 0 0 - 5 0 ratio   PSA Total (Reflex To Free)    Collection Time: 04/08/21  7:19 AM   Result Value Ref Range    Prostate Specific Antigen Total 0 6 0 0 - 4 0 ng/mL    Reflex Criteria Comment    TSH, 3rd generation    Collection Time: 04/08/21  7:19 AM   Result Value Ref Range    TSH 1 930 0 450 - 4 500 uIU/mL

## 2021-04-12 NOTE — PATIENT INSTRUCTIONS
DISCUSSED HEALTH MAINTENENCE ISSUES  BW WILL BE REVIEWED  ENCOURAGED HEALTHY DIET AND EXERCISE  COLONOSCOPY WILL BE ORDERED  RV FOR ANNUAL HEALTH EXAM IN 1 YEAR  RV SOONER IF THERE ARE ANY CONCERNS    Recent Results (from the past 672 hour(s))   CBC and differential    Collection Time: 04/08/21  7:19 AM   Result Value Ref Range    White Blood Cell Count 5 3 3 4 - 10 8 x10E3/uL    Red Blood Cell Count 4 79 4 14 - 5 80 x10E6/uL    Hemoglobin 14 9 13 0 - 17 7 g/dL    HCT 45 4 37 5 - 51 0 %    MCV 95 79 - 97 fL    MCH 31 1 26 6 - 33 0 pg    MCHC 32 8 31 5 - 35 7 g/dL    RDW 12 4 11 6 - 15 4 %    Platelet Count 538 127 - 450 x10E3/uL    Neutrophils 49 Not Estab  %    Lymphocytes 37 Not Estab  %    Monocytes 11 Not Estab  %    Eosinophils 2 Not Estab  %    Basophils PCT 1 Not Estab  %    Neutrophils (Absolute) 2 6 1 4 - 7 0 x10E3/uL    Lymphocytes (Absolute) 1 9 0 7 - 3 1 x10E3/uL    Monocytes (Absolute) 0 6 0 1 - 0 9 x10E3/uL    Eosinophils (Absolute) 0 1 0 0 - 0 4 x10E3/uL    Basophils ABS 0 0 0 0 - 0 2 x10E3/uL    Immature Granulocytes 0 Not Estab  %    Immature Granulocytes (Absolute) 0 0 0 0 - 0 1 x10E3/uL   Comprehensive metabolic panel    Collection Time: 04/08/21  7:19 AM   Result Value Ref Range    Glucose, Random 87 65 - 99 mg/dL    BUN 16 6 - 24 mg/dL    Creatinine 0 83 0 76 - 1 27 mg/dL    eGFR Non  98 >59 mL/min/1 73    eGFR  114 >59 mL/min/1 73    SL AMB BUN/CREATININE RATIO 19 9 - 20    Sodium 141 134 - 144 mmol/L    Potassium 4 1 3 5 - 5 2 mmol/L    Chloride 104 96 - 106 mmol/L    CO2 26 20 - 29 mmol/L    CALCIUM 9 1 8 7 - 10 2 mg/dL    Protein, Total 6 8 6 0 - 8 5 g/dL    Albumin 4 2 3 8 - 4 9 g/dL    Globulin, Total 2 6 1 5 - 4 5 g/dL    Albumin/Globulin Ratio 1 6 1 2 - 2 2    TOTAL BILIRUBIN 0 3 0 0 - 1 2 mg/dL    Alk Phos Isoenzymes 80 39 - 117 IU/L    AST 17 0 - 40 IU/L    ALT 20 0 - 44 IU/L   Lipid panel    Collection Time: 04/08/21  7:19 AM   Result Value Ref Range Cholesterol, Total 195 100 - 199 mg/dL    Triglycerides 105 0 - 149 mg/dL    HDL 50 >39 mg/dL    VLDL Cholesterol Calculated 19 5 - 40 mg/dL    LDL Calculated 126 (H) 0 - 99 mg/dL    T   Chol/HDL Ratio 3 9 0 0 - 5 0 ratio   PSA Total (Reflex To Free)    Collection Time: 04/08/21  7:19 AM   Result Value Ref Range    Prostate Specific Antigen Total 0 6 0 0 - 4 0 ng/mL    Reflex Criteria Comment    TSH, 3rd generation    Collection Time: 04/08/21  7:19 AM   Result Value Ref Range    TSH 1 930 0 450 - 4 500 uIU/mL

## 2021-05-17 ENCOUNTER — TELEPHONE (OUTPATIENT)
Dept: FAMILY MEDICINE CLINIC | Facility: CLINIC | Age: 56
End: 2021-05-17

## 2021-05-17 NOTE — TELEPHONE ENCOUNTER
Reny Shilo returned [employer] call for results  Informed him of his results per Dr Neema Small: Bearl Neighbor scheduled for Wed  No further action required

## 2021-05-19 ENCOUNTER — OFFICE VISIT (OUTPATIENT)
Dept: FAMILY MEDICINE CLINIC | Facility: CLINIC | Age: 56
End: 2021-05-19
Payer: COMMERCIAL

## 2021-05-19 VITALS
BODY MASS INDEX: 29.78 KG/M2 | SYSTOLIC BLOOD PRESSURE: 112 MMHG | HEIGHT: 70 IN | RESPIRATION RATE: 16 BRPM | TEMPERATURE: 97.3 F | WEIGHT: 208 LBS | DIASTOLIC BLOOD PRESSURE: 82 MMHG | OXYGEN SATURATION: 99 % | HEART RATE: 55 BPM

## 2021-05-19 DIAGNOSIS — E78.2 MIXED HYPERLIPIDEMIA: ICD-10-CM

## 2021-05-19 DIAGNOSIS — R93.1 ELEVATED CORONARY ARTERY CALCIUM SCORE: Primary | ICD-10-CM

## 2021-05-19 DIAGNOSIS — N52.9 ERECTILE DYSFUNCTION, UNSPECIFIED ERECTILE DYSFUNCTION TYPE: ICD-10-CM

## 2021-05-19 PROCEDURE — 1036F TOBACCO NON-USER: CPT | Performed by: FAMILY MEDICINE

## 2021-05-19 PROCEDURE — 3008F BODY MASS INDEX DOCD: CPT | Performed by: FAMILY MEDICINE

## 2021-05-19 PROCEDURE — 99213 OFFICE O/P EST LOW 20 MIN: CPT | Performed by: FAMILY MEDICINE

## 2021-05-19 RX ORDER — SILDENAFIL 100 MG/1
100 TABLET, FILM COATED ORAL DAILY PRN
Qty: 10 TABLET | Refills: 3 | Status: SHIPPED | OUTPATIENT
Start: 2021-05-19 | End: 2022-06-09

## 2021-05-19 RX ORDER — ASPIRIN 81 MG/1
81 TABLET ORAL DAILY
Qty: 90 TABLET | Refills: 1
Start: 2021-05-19

## 2021-05-19 NOTE — PATIENT INSTRUCTIONS
DISCUSSED THERAPEUTIC PLAN  ENTERIC ASA DAILY  CONSIDER CRESTOR FOR CHOLESTEROL MANAGEMENT  CARDIAC CONSULT / EST/ ECHO    RV 6 WEEKS FOR REPEAT BW

## 2021-05-19 NOTE — PROGRESS NOTES
Assessment/Plan:    No problem-specific Assessment & Plan notes found for this encounter  Diagnoses and all orders for this visit:    Elevated coronary artery calcium score  -     Ambulatory referral to Cardiology; Future  -     aspirin (ECOTRIN LOW STRENGTH) 81 mg EC tablet; Take 1 tablet (81 mg total) by mouth daily    Mixed hyperlipidemia  -     Ambulatory referral to Cardiology; Future  -     aspirin (ECOTRIN LOW STRENGTH) 81 mg EC tablet; Take 1 tablet (81 mg total) by mouth daily          Patient Instructions   DISCUSSED THERAPEUTIC PLAN  ENTERIC ASA DAILY  CONSIDER CRESTOR FOR CHOLESTEROL MANAGEMENT  CARDIAC CONSULT / EST/ ECHO    RV 6 WEEKS FOR REPEAT BW      Return in about 6 months (around 11/19/2021) for Recheck  Subjective:      Patient ID: Víctor Hay is a 61 y o  male  Chief Complaint   Patient presents with    Follow-up      CT Calcium Score       DISCUSSION    REVIEWED CALCIUM SCORING    REVIEWED THERAPEUTIC OPTIONS  CONCERNED ABOUT STATIN SIDE EFFECTS  RECOMMENDED STARTING ASA, CARDIOLOGY CONSULT      The following portions of the patient's history were reviewed and updated as appropriate: allergies, current medications, past family history, past medical history, past social history, past surgical history and problem list     Review of Systems   Constitutional: Negative for chills, fatigue and fever  HENT: Negative for sore throat  Eyes: Negative for discharge  Respiratory: Negative for cough and chest tightness  Cardiovascular: Negative for chest pain and palpitations  Gastrointestinal: Negative for abdominal pain, diarrhea, nausea and vomiting  Musculoskeletal: Negative for arthralgias and gait problem  Neurological: Negative for dizziness, weakness and headaches  Hematological: Negative for adenopathy  Psychiatric/Behavioral: The patient is not nervous/anxious            Current Outpatient Medications   Medication Sig Dispense Refill    tadalafil (CIALIS) 20 MG tablet Take 1 tablet (20 mg total) by mouth daily as needed for erectile dysfunction 10 tablet 1    aspirin (ECOTRIN LOW STRENGTH) 81 mg EC tablet Take 1 tablet (81 mg total) by mouth daily 90 tablet 1     No current facility-administered medications for this visit          Objective:    /82   Pulse 55   Temp (!) 97 3 °F (36 3 °C) (Temporal)   Resp 16   Ht 5' 10" (1 778 m)   Wt 94 3 kg (208 lb)   SpO2 99%   BMI 29 84 kg/m²        Physical Exam       NO PE WAS PERFORMED    LENGTH OF VISIT 20 MIN  LENGTH OF  20 MIN    Bijan Babb MD

## 2021-05-19 NOTE — LETTER
May 19, 2021     [facility], [address] [facility] [address]    Patient: Cb Steven   YOB: 1965   Date of Visit: 5/19/2021       Dear Dr Wood Serve: Thank you for referring Cb Steven to me for evaluation  Below are my notes for this consultation  If you have questions, please do not hesitate to call me  I look forward to following your patient along with you  Sincerely,        Zoraida Jeong MD        CC: No Recipients  Zoraida Jeong MD  5/19/2021  9:21 AM  Incomplete   Assessment/Plan:    No problem-specific Assessment & Plan notes found for this encounter  Diagnoses and all orders for this visit:    Elevated coronary artery calcium score  -     Ambulatory referral to Cardiology; Future  -     aspirin (ECOTRIN LOW STRENGTH) 81 mg EC tablet; Take 1 tablet (81 mg total) by mouth daily    Mixed hyperlipidemia  -     Ambulatory referral to Cardiology; Future  -     aspirin (ECOTRIN LOW STRENGTH) 81 mg EC tablet; Take 1 tablet (81 mg total) by mouth daily          Patient Instructions   DISCUSSED THERAPEUTIC PLAN  ENTERIC ASA DAILY  CONSIDER CRESTOR FOR CHOLESTEROL MANAGEMENT  CARDIAC CONSULT / EST/ ECHO    RV 6 WEEKS FOR REPEAT BW      No follow-ups on file  Subjective:      Patient ID: Cb Steven is a 61 y o  male  Chief Complaint   Patient presents with    Follow-up      CT Calcium Score       DISCUSSION    REVIEWED CALCIUM SCORING    REVIEWED THERAPEUTIC OPTIONS  CONCERNED ABOUT STATIN SIDE EFFECTS  RECOMMENDED STARTING ASA, CARDIOLOGY CONSULT      The following portions of the patient's history were reviewed and updated as appropriate: allergies, current medications, past family history, past medical history, past social history, past surgical history and problem list     Review of Systems   Constitutional: Negative for chills, fatigue and fever  HENT: Negative for sore throat  Eyes: Negative for discharge     Respiratory: Negative for cough and chest tightness  Cardiovascular: Negative for chest pain and palpitations  Gastrointestinal: Negative for abdominal pain, diarrhea, nausea and vomiting  Musculoskeletal: Negative for arthralgias and gait problem  Neurological: Negative for dizziness, weakness and headaches  Hematological: Negative for adenopathy  Psychiatric/Behavioral: The patient is not nervous/anxious  Current Outpatient Medications   Medication Sig Dispense Refill    tadalafil (CIALIS) 20 MG tablet Take 1 tablet (20 mg total) by mouth daily as needed for erectile dysfunction 10 tablet 1    aspirin (ECOTRIN LOW STRENGTH) 81 mg EC tablet Take 1 tablet (81 mg total) by mouth daily 90 tablet 1     No current facility-administered medications for this visit          Objective:    /82   Pulse 55   Temp (!) 97 3 °F (36 3 °C) (Temporal)   Resp 16   Ht 5' 10" (1 778 m)   Wt 94 3 kg (208 lb)   SpO2 99%   BMI 29 84 kg/m²        Physical Exam       NO PE WAS PERFORMED    LENGTH OF VISIT 20 MIN  LENGTH OF  20 MIN    Abdiaziz Reyna MD

## 2021-10-13 ENCOUNTER — OFFICE VISIT (OUTPATIENT)
Dept: FAMILY MEDICINE CLINIC | Facility: CLINIC | Age: 56
End: 2021-10-13
Payer: COMMERCIAL

## 2021-10-13 VITALS
WEIGHT: 214 LBS | OXYGEN SATURATION: 99 % | HEART RATE: 64 BPM | TEMPERATURE: 97.4 F | SYSTOLIC BLOOD PRESSURE: 128 MMHG | RESPIRATION RATE: 12 BRPM | BODY MASS INDEX: 30.64 KG/M2 | DIASTOLIC BLOOD PRESSURE: 90 MMHG | HEIGHT: 70 IN

## 2021-10-13 DIAGNOSIS — M70.21 OLECRANON BURSITIS OF RIGHT ELBOW: Primary | ICD-10-CM

## 2021-10-13 PROCEDURE — 99213 OFFICE O/P EST LOW 20 MIN: CPT | Performed by: FAMILY MEDICINE

## 2021-10-13 PROCEDURE — 96372 THER/PROPH/DIAG INJ SC/IM: CPT | Performed by: FAMILY MEDICINE

## 2021-10-13 PROCEDURE — 3008F BODY MASS INDEX DOCD: CPT | Performed by: FAMILY MEDICINE

## 2021-10-13 PROCEDURE — 1036F TOBACCO NON-USER: CPT | Performed by: FAMILY MEDICINE

## 2021-10-13 RX ORDER — CEPHALEXIN 500 MG/1
500 CAPSULE ORAL EVERY 12 HOURS SCHEDULED
Qty: 20 CAPSULE | Refills: 0 | Status: SHIPPED | OUTPATIENT
Start: 2021-10-13 | End: 2021-10-23

## 2021-10-13 RX ORDER — ATORVASTATIN CALCIUM 20 MG/1
TABLET, FILM COATED ORAL
COMMUNITY
Start: 2021-10-06

## 2021-10-13 RX ORDER — PREDNISONE 20 MG/1
TABLET ORAL
Qty: 14 TABLET | Refills: 0 | Status: SHIPPED | OUTPATIENT
Start: 2021-10-13 | End: 2022-04-13

## 2021-10-13 RX ORDER — KETOROLAC TROMETHAMINE 30 MG/ML
60 INJECTION, SOLUTION INTRAMUSCULAR; INTRAVENOUS ONCE
Status: COMPLETED | OUTPATIENT
Start: 2021-10-13 | End: 2021-10-13

## 2021-10-13 RX ORDER — DEXAMETHASONE SODIUM PHOSPHATE 4 MG/ML
4 INJECTION, SOLUTION INTRA-ARTICULAR; INTRALESIONAL; INTRAMUSCULAR; INTRAVENOUS; SOFT TISSUE EVERY 6 HOURS SCHEDULED
Status: DISCONTINUED | OUTPATIENT
Start: 2021-10-13 | End: 2021-10-14

## 2021-10-13 RX ADMIN — DEXAMETHASONE SODIUM PHOSPHATE 4 MG: 4 INJECTION, SOLUTION INTRA-ARTICULAR; INTRALESIONAL; INTRAMUSCULAR; INTRAVENOUS; SOFT TISSUE at 14:20

## 2021-10-13 RX ADMIN — KETOROLAC TROMETHAMINE 60 MG: 30 INJECTION, SOLUTION INTRAMUSCULAR; INTRAVENOUS at 14:22

## 2022-03-04 DIAGNOSIS — Z00.00 ROUTINE GENERAL MEDICAL EXAMINATION AT A HEALTH CARE FACILITY: Primary | ICD-10-CM

## 2022-03-04 DIAGNOSIS — Z13.6 SCREENING FOR HYPERTENSION: ICD-10-CM

## 2022-03-04 DIAGNOSIS — E78.2 MIXED HYPERLIPIDEMIA: ICD-10-CM

## 2022-03-04 DIAGNOSIS — Z13.29 SCREENING FOR HYPOTHYROIDISM: ICD-10-CM

## 2022-03-04 DIAGNOSIS — Z12.5 ENCOUNTER FOR PROSTATE CANCER SCREENING: ICD-10-CM

## 2022-03-04 DIAGNOSIS — Z13.220 SCREENING FOR HYPERLIPIDEMIA: ICD-10-CM

## 2022-04-06 ENCOUNTER — RA CDI HCC (OUTPATIENT)
Dept: OTHER | Facility: HOSPITAL | Age: 57
End: 2022-04-06

## 2022-04-06 NOTE — PROGRESS NOTES
[facility]  coding opportunities       Chart reviewed, no opportunity found: CHART REVIEWED, NO OPPORTUNITY FOUND        Patients Insurance        Commercial Insurance: Jennifer Andujar

## 2022-04-07 LAB
BASOPHILS # BLD AUTO: 0 X10E3/UL (ref 0–0.2)
BASOPHILS NFR BLD AUTO: 1 %
EOSINOPHIL # BLD AUTO: 0.1 X10E3/UL (ref 0–0.4)
EOSINOPHIL NFR BLD AUTO: 2 %
ERYTHROCYTE [DISTWIDTH] IN BLOOD BY AUTOMATED COUNT: 12.3 % (ref 11.6–15.4)
HCT VFR BLD AUTO: 42.2 % (ref 37.5–51)
HGB BLD-MCNC: 14.7 G/DL (ref 13–17.7)
IMM GRANULOCYTES # BLD: 0 X10E3/UL (ref 0–0.1)
IMM GRANULOCYTES NFR BLD: 0 %
LYMPHOCYTES # BLD AUTO: 1.6 X10E3/UL (ref 0.7–3.1)
LYMPHOCYTES NFR BLD AUTO: 30 %
MCH RBC QN AUTO: 32.6 PG (ref 26.6–33)
MCHC RBC AUTO-ENTMCNC: 34.8 G/DL (ref 31.5–35.7)
MCV RBC AUTO: 94 FL (ref 79–97)
MONOCYTES # BLD AUTO: 0.5 X10E3/UL (ref 0.1–0.9)
MONOCYTES NFR BLD AUTO: 9 %
NEUTROPHILS # BLD AUTO: 3.1 X10E3/UL (ref 1.4–7)
NEUTROPHILS NFR BLD AUTO: 58 %
PLATELET # BLD AUTO: 214 X10E3/UL (ref 150–450)
RBC # BLD AUTO: 4.51 X10E6/UL (ref 4.14–5.8)
WBC # BLD AUTO: 5.4 X10E3/UL (ref 3.4–10.8)

## 2022-04-08 LAB
ALBUMIN SERPL-MCNC: 4.2 G/DL (ref 3.8–4.9)
ALBUMIN/GLOB SERPL: 1.8 {RATIO} (ref 1.2–2.2)
ALP SERPL-CCNC: 87 IU/L (ref 44–121)
ALT SERPL-CCNC: 29 IU/L (ref 0–44)
AST SERPL-CCNC: 21 IU/L (ref 0–40)
BILIRUB SERPL-MCNC: 0.6 MG/DL (ref 0–1.2)
BUN SERPL-MCNC: 18 MG/DL (ref 6–24)
BUN/CREAT SERPL: 21 (ref 9–20)
CALCIUM SERPL-MCNC: 9.2 MG/DL (ref 8.7–10.2)
CHLORIDE SERPL-SCNC: 104 MMOL/L (ref 96–106)
CHOLEST SERPL-MCNC: 125 MG/DL (ref 100–199)
CHOLEST/HDLC SERPL: 2.7 RATIO (ref 0–5)
CO2 SERPL-SCNC: 24 MMOL/L (ref 20–29)
CREAT SERPL-MCNC: 0.86 MG/DL (ref 0.76–1.27)
EGFR: 101 ML/MIN/1.73
GLOBULIN SER-MCNC: 2.4 G/DL (ref 1.5–4.5)
GLUCOSE SERPL-MCNC: 97 MG/DL (ref 65–99)
HDLC SERPL-MCNC: 47 MG/DL
LDLC SERPL CALC-MCNC: 66 MG/DL (ref 0–99)
POTASSIUM SERPL-SCNC: 4.6 MMOL/L (ref 3.5–5.2)
PROT SERPL-MCNC: 6.6 G/DL (ref 6–8.5)
PSA SERPL-MCNC: 0.5 NG/ML (ref 0–4)
SL AMB REFLEX CRITERIA: NORMAL
SL AMB VLDL CHOLESTEROL CALC: 12 MG/DL (ref 5–40)
SODIUM SERPL-SCNC: 141 MMOL/L (ref 134–144)
TRIGL SERPL-MCNC: 55 MG/DL (ref 0–149)
TSH SERPL DL<=0.005 MIU/L-ACNC: 1.01 UIU/ML (ref 0.45–4.5)

## 2022-04-13 ENCOUNTER — OFFICE VISIT (OUTPATIENT)
Dept: FAMILY MEDICINE CLINIC | Facility: CLINIC | Age: 57
End: 2022-04-13
Payer: COMMERCIAL

## 2022-04-13 VITALS
SYSTOLIC BLOOD PRESSURE: 122 MMHG | BODY MASS INDEX: 33.07 KG/M2 | TEMPERATURE: 97.3 F | DIASTOLIC BLOOD PRESSURE: 76 MMHG | RESPIRATION RATE: 16 BRPM | HEART RATE: 59 BPM | OXYGEN SATURATION: 96 % | HEIGHT: 70 IN | WEIGHT: 231 LBS

## 2022-04-13 DIAGNOSIS — Z00.00 ROUTINE GENERAL MEDICAL EXAMINATION AT A HEALTH CARE FACILITY: ICD-10-CM

## 2022-04-13 DIAGNOSIS — Z13.6 SCREENING FOR HYPERTENSION: ICD-10-CM

## 2022-04-13 DIAGNOSIS — Z12.11 COLON CANCER SCREENING: ICD-10-CM

## 2022-04-13 DIAGNOSIS — Z12.5 ENCOUNTER FOR PROSTATE CANCER SCREENING: ICD-10-CM

## 2022-04-13 DIAGNOSIS — Z13.29 SCREENING FOR HYPOTHYROIDISM: ICD-10-CM

## 2022-04-13 DIAGNOSIS — E78.2 MIXED HYPERLIPIDEMIA: ICD-10-CM

## 2022-04-13 DIAGNOSIS — I77.810 DILATED AORTIC ROOT (HCC): ICD-10-CM

## 2022-04-13 DIAGNOSIS — R93.1 ELEVATED CORONARY ARTERY CALCIUM SCORE: ICD-10-CM

## 2022-04-13 DIAGNOSIS — R31.9 HEMATURIA, UNSPECIFIED TYPE: Primary | ICD-10-CM

## 2022-04-13 PROBLEM — Z13.220 SCREENING FOR HYPERLIPIDEMIA: Status: RESOLVED | Noted: 2020-02-17 | Resolved: 2022-04-13

## 2022-04-13 PROBLEM — M70.21 OLECRANON BURSITIS OF RIGHT ELBOW: Status: RESOLVED | Noted: 2021-10-13 | Resolved: 2022-04-13

## 2022-04-13 LAB
SL AMB  POCT GLUCOSE, UA: ABNORMAL
SL AMB LEUKOCYTE ESTERASE,UA: 0
SL AMB POCT BILIRUBIN,UA: 0
SL AMB POCT BLOOD,UA: 50
SL AMB POCT CLARITY,UA: CLEAR
SL AMB POCT COLOR,UA: YELLOW
SL AMB POCT FECES OCC BLD: NORMAL
SL AMB POCT KETONES,UA: 0
SL AMB POCT NITRITE,UA: 0
SL AMB POCT PH,UA: 5
SL AMB POCT SPECIFIC GRAVITY,UA: 1.01
SL AMB POCT URINE PROTEIN: 0
SL AMB POCT UROBILINOGEN: ABNORMAL

## 2022-04-13 PROCEDURE — 82270 OCCULT BLOOD FECES: CPT | Performed by: FAMILY MEDICINE

## 2022-04-13 PROCEDURE — 99396 PREV VISIT EST AGE 40-64: CPT | Performed by: FAMILY MEDICINE

## 2022-04-13 PROCEDURE — 93000 ELECTROCARDIOGRAM COMPLETE: CPT | Performed by: FAMILY MEDICINE

## 2022-04-13 PROCEDURE — 3725F SCREEN DEPRESSION PERFORMED: CPT | Performed by: FAMILY MEDICINE

## 2022-04-13 PROCEDURE — 3008F BODY MASS INDEX DOCD: CPT | Performed by: FAMILY MEDICINE

## 2022-04-13 PROCEDURE — 1036F TOBACCO NON-USER: CPT | Performed by: FAMILY MEDICINE

## 2022-04-13 PROCEDURE — 81003 URINALYSIS AUTO W/O SCOPE: CPT | Performed by: FAMILY MEDICINE

## 2022-04-13 NOTE — LETTER
2022     Ilya Huttonon, [address] [address]    Patient: Walker Leong   YOB: 1965   Date of Visit: 2022       Dear Dr Elena Smith: Thank you for referring Walker Leong to me for evaluation  Below are my notes for this consultation  If you have questions, please do not hesitate to call me  I look forward to following your patient along with you  Sincerely,        Esvin Del Toro MD        CC: No Recipients  Esvin Del Toro MD  2022  8:33 AM  Addendum  BMI Counseling: Body mass index is 33 15 kg/m²  The BMI is above normal  Nutrition recommendations include encouraging healthy choices of fruits and vegetables and moderation in carbohydrate intake  Exercise recommendations include exercising 3-5 times per week  No pharmacotherapy was ordered  Rationale for BMI follow-up plan is due to patient being overweight or obese  Depression Screening and Follow-up Plan: Patient was screened for depression during today's encounter  They screened negative with a PHQ-2 score of 0     [facility] HEALTH MAINTENANCE OFFICE VISIT  [place] [address] PHYSICIANS    NAME: Walker Leong  AGE: 61 y o   SEX: male  : 1965     DATE: 2022    Assessment and Plan     Problem List Items Addressed This Visit        Cardiovascular and Mediastinum    Elevated coronary artery calcium score    Relevant Orders    POCT ECG (Completed)    Dilated aortic root (HCC)       Other    Encounter for prostate cancer screening    Screening for hypothyroidism    Screening for hypertension    Colon cancer screening    Relevant Orders    POCT hemoccult screening (Completed)    Mixed hyperlipidemia    Relevant Orders    POCT ECG (Completed)      Other Visit Diagnoses     Routine general medical examination at a health care facility    -  Primary               Return in about 6 months (around 10/13/2022) for Recheck  Chief Complaint     Chief Complaint   Patient presents with    Physical Exam       History of Present Illness     [address] [place] [address] Rd RECORD  NO CONCERNS AT THIS TIME        Well Adult Physical   Patient here for a comprehensive physical exam       Diet and Physical Activity  Diet: well balanced diet  Weight concerns: Patient has class 1 obesity (BMI 30-34  9)  Exercise: frequently      Depression Screen  PHQ-2/9 Depression Screening    Little interest or pleasure in doing things: 0 - not at all  Feeling down, depressed, or hopeless: 0 - not at all  PHQ-2 Score: 0  PHQ-2 Interpretation: Negative depression screen          General Health  Hearing: Normal:  bilateral  Vision: no vision problems  Dental: regular dental visits    Reproductive Health          The following portions of the patient's history were reviewed and updated as appropriate: allergies, current medications, past family history, past medical history, past social history, past surgical history and problem list     Review of Systems     Review of Systems   Constitutional: Negative for chills, fatigue and fever  HENT: Negative for congestion, ear discharge, ear pain, mouth sores, postnasal drip, sore throat and trouble swallowing  Eyes: Negative for pain, discharge and visual disturbance  Respiratory: Negative for cough, shortness of breath and wheezing  Cardiovascular: Negative for chest pain, palpitations and leg swelling  Gastrointestinal: Negative for abdominal distention, abdominal pain, blood in stool, diarrhea and nausea  Endocrine: Negative for polydipsia, polyphagia and polyuria  Genitourinary: Negative for dysuria, frequency, hematuria and urgency  Musculoskeletal: Negative for arthralgias, gait problem and joint swelling  Skin: Negative for pallor and rash  Neurological: Negative for dizziness, syncope, speech difficulty, weakness, light-headedness, numbness and headaches  Hematological: Negative for adenopathy  Psychiatric/Behavioral: Negative for behavioral problems, confusion and sleep disturbance  The patient is not nervous/anxious  Past Medical History     History reviewed  No pertinent past medical history      Past Surgical History     Past Surgical History:   Procedure Laterality Date    ADENOIDECTOMY      APPENDECTOMY      GALLBLADDER SURGERY         Social History     Social History     Socioeconomic History    Marital status: /Civil Union     Spouse name: None    Number of children: None    Years of education: None    Highest education level: None   Occupational History    None   Tobacco Use    Smoking status: Never Smoker    Smokeless tobacco: Never Used   Vaping Use    Vaping Use: Never used   Substance and Sexual Activity    Alcohol use: Yes     Comment: Minimum alcohol consumption    Drug use: No    Sexual activity: None   Other Topics Concern    None   Social History Narrative    Caffeine use    Drinks coffee    Patient ingests cola containing caffeine     Social Determinants of Health     Financial Resource Strain: Not on file   Food Insecurity: Not on file   Transportation Needs: Not on file   Physical Activity: Not on file   Stress: Not on file   Social Connections: Not on file   Intimate Partner Violence: Not on file   Housing Stability: Not on file       Family History     Family History   Problem Relation Age of Onset    Coronary artery disease Father 70        CAD involving CABG of native heart with other forms of angina pectoris    Hypertension Father         Essential Hypertension    Heart attack Father         Myocardial Infarction involving other coronary artery    Heart attack Cousin         Myocardial Infarction involving other coronary artery    Mental illness Neg Hx     Substance Abuse Neg Hx        Current Medications       Current Outpatient Medications:     aspirin (ECOTRIN LOW STRENGTH) 81 mg EC tablet, Take 1 tablet (81 mg total) by mouth daily, Disp: 90 tablet, Rfl: 1    atorvastatin (LIPITOR) 20 mg tablet, , Disp: , Rfl:     sildenafil (VIAGRA) 100 mg tablet, Take 1 tablet (100 mg total) by mouth daily as needed for erectile dysfunction, Disp: 10 tablet, Rfl: 3     Allergies     No Known Allergies    Objective     /76   Pulse 59   Temp (!) 97 3 °F (36 3 °C) (Temporal)   Resp 16   Ht 5' 10" (1 778 m)   Wt 105 kg (231 lb)   SpO2 96%   BMI 33 15 kg/m²      Physical Exam  Constitutional:       Appearance: He is well-developed  HENT:      Head: Normocephalic and atraumatic  Right Ear: External ear normal       Left Ear: External ear normal       Nose: Nose normal    Eyes:      General:         Right eye: No discharge  Left eye: No discharge  Conjunctiva/sclera: Conjunctivae normal       Pupils: Pupils are equal, round, and reactive to light  Neck:      Thyroid: No thyromegaly  Vascular: No JVD  Cardiovascular:      Rate and Rhythm: Normal rate and regular rhythm  Heart sounds: Normal heart sounds  No murmur heard  Pulmonary:      Effort: Pulmonary effort is normal       Breath sounds: Normal breath sounds  No wheezing or rales  Abdominal:      General: Bowel sounds are normal       Palpations: Abdomen is soft  There is no mass  Tenderness: There is no abdominal tenderness  There is no guarding or rebound  Genitourinary:     Penis: Normal        Prostate: Normal       Rectum: Normal  Guaiac result negative  Musculoskeletal:         General: No tenderness or deformity  Normal range of motion  Cervical back: Neck supple  Lymphadenopathy:      Cervical: No cervical adenopathy  Skin:     General: Skin is warm and dry  Findings: No erythema or rash  Neurological:      Mental Status: He is alert and oriented to person, place, and time  Cranial Nerves: No cranial nerve deficit  Motor: No abnormal muscle tone        Coordination: Coordination normal       Deep Tendon Reflexes: Reflexes are normal and symmetric  Psychiatric:         Behavior: Behavior normal          Thought Content:  Thought content normal          Judgment: Judgment normal            No exam data present    Health Maintenance     Health Maintenance   Topic Date Due    Influenza Vaccine (1) Never done    Colorectal Cancer Screening  09/26/2022    Depression Screening  04/13/2023    BMI: Followup Plan  04/13/2023    BMI: Adult  04/13/2023    Annual Physical  04/13/2023    DTaP,Tdap,and Td Vaccines (2 - Td or Tdap) 07/02/2028    HIV Screening  Completed    Hepatitis C Screening  Completed    COVID-19 Vaccine  Completed    Pneumococcal Vaccine: Pediatrics (0 to 5 Years) and At-Risk Patients (6 to 59 Years)  Aged Out    HIB Vaccine  Aged Out    Hepatitis B Vaccine  Aged Out    IPV Vaccine  Aged Out    Hepatitis A Vaccine  Aged Out    Meningococcal ACWY Vaccine  Aged Out    HPV Vaccine  Aged [employer] History   Administered Date(s) Administered    COVID-19 PFIZER VACCINE 0 3 ML IM 04/03/2021, 04/24/2021, 12/18/2021    Tdap 07/02/2018       George Doe MD  [facility]

## 2022-04-13 NOTE — LETTER
JAY CAPELLAN      Current Outpatient Medications:     aspirin (ECOTRIN LOW STRENGTH) 81 mg EC tablet, Take 1 tablet (81 mg total) by mouth daily, Disp: 90 tablet, Rfl: 1    atorvastatin (LIPITOR) 20 mg tablet, , Disp: , Rfl:     sildenafil (VIAGRA) 100 mg tablet, Take 1 tablet (100 mg total) by mouth daily as needed for erectile dysfunction, Disp: 10 tablet, Rfl: 3      Recent Results (from the past 672 hour(s))   CBC and differential    Collection Time: 04/07/22  8:40 AM   Result Value Ref Range    White Blood Cell Count 5 4 3 4 - 10 8 x10E3/uL    Red Blood Cell Count 4 51 4 14 - 5 80 x10E6/uL    Hemoglobin 14 7 13 0 - 17 7 g/dL    HCT 42 2 37 5 - 51 0 %    MCV 94 79 - 97 fL    MCH 32 6 26 6 - 33 0 pg    MCHC 34 8 31 5 - 35 7 g/dL    RDW 12 3 11 6 - 15 4 %    Platelet Count 726 851 - 450 x10E3/uL    Neutrophils 58 Not Estab  %    Lymphocytes 30 Not Estab  %    Monocytes 9 Not Estab  %    Eosinophils 2 Not Estab  %    Basophils PCT 1 Not Estab  %    Neutrophils (Absolute) 3 1 1 4 - 7 0 x10E3/uL    Lymphocytes (Absolute) 1 6 0 7 - 3 1 x10E3/uL    Monocytes (Absolute) 0 5 0 1 - 0 9 x10E3/uL    Eosinophils (Absolute) 0 1 0 0 - 0 4 x10E3/uL    Basophils ABS 0 0 0 0 - 0 2 x10E3/uL    Immature Granulocytes 0 Not Estab  %    Immature Granulocytes (Absolute) 0 0 0 0 - 0 1 x10E3/uL   Comprehensive metabolic panel    Collection Time: 04/07/22  8:40 AM   Result Value Ref Range    Glucose, Random 97 65 - 99 mg/dL    BUN 18 6 - 24 mg/dL    Creatinine 0 86 0 76 - 1 27 mg/dL    eGFR 101 >59 mL/min/1 73    SL AMB BUN/CREATININE RATIO 21 (H) 9 - 20    Sodium 141 134 - 144 mmol/L    Potassium 4 6 3 5 - 5 2 mmol/L    Chloride 104 96 - 106 mmol/L    CO2 24 20 - 29 mmol/L    CALCIUM 9 2 8 7 - 10 2 mg/dL    Protein, Total 6 6 6 0 - 8 5 g/dL    Albumin 4 2 3 8 - 4 9 g/dL    Globulin, Total 2 4 1 5 - 4 5 g/dL    Albumin/Globulin Ratio 1 8 1 2 - 2 2    TOTAL BILIRUBIN 0 6 0 0 - 1 2 mg/dL    Alk Phos Isoenzymes 87 44 - 121 IU/L AST 21 0 - 40 IU/L    ALT 29 0 - 44 IU/L   Lipid panel    Collection Time: 04/07/22  8:40 AM   Result Value Ref Range    Cholesterol, Total 125 100 - 199 mg/dL    Triglycerides 55 0 - 149 mg/dL    HDL 47 >39 mg/dL    VLDL Cholesterol Calculated 12 5 - 40 mg/dL    LDL Calculated 66 0 - 99 mg/dL    T   Chol/HDL Ratio 2 7 0 0 - 5 0 ratio   PSA Total (Reflex To Free)    Collection Time: 04/07/22  8:40 AM   Result Value Ref Range    Prostate Specific Antigen Total 0 5 0 0 - 4 0 ng/mL    Reflex Criteria Comment    TSH, 3rd generation    Collection Time: 04/07/22  8:40 AM   Result Value Ref Range    TSH 1 010 0 450 - 4 500 uIU/mL   POCT hemoccult screening    Collection Time: 04/13/22  8:27 AM   Result Value Ref Range    FECES OCC BLD NEG

## 2022-04-13 NOTE — PROGRESS NOTES
BMI Counseling: Body mass index is 33 15 kg/m²  The BMI is above normal  Nutrition recommendations include encouraging healthy choices of fruits and vegetables and moderation in carbohydrate intake  Exercise recommendations include exercising 3-5 times per week  No pharmacotherapy was ordered  Rationale for BMI follow-up plan is due to patient being overweight or obese  Depression Screening and Follow-up Plan: Patient was screened for depression during today's encounter  They screened negative with a PHQ-2 score of 0     [facility] HEALTH MAINTENANCE OFFICE VISIT  [facility] [place] [address] PHYSICIANS    NAME: Gracie Waterman  AGE: 61 y o  SEX: male  : 1965     DATE: 2022    Assessment and Plan     Problem List Items Addressed This Visit        Cardiovascular and Mediastinum    Elevated coronary artery calcium score    Relevant Orders    POCT ECG (Completed)    Dilated aortic root (HCC)       Other    Encounter for prostate cancer screening    Screening for hypothyroidism    Screening for hypertension    Colon cancer screening    Relevant Orders    POCT hemoccult screening (Completed)    Ambulatory referral for colonoscopy    Mixed hyperlipidemia    Relevant Orders    POCT ECG (Completed)      Other Visit Diagnoses     Routine general medical examination at a health care facility    -  Primary               Return in about 6 months (around 10/13/2022) for Recheck  Chief Complaint     Chief Complaint   Patient presents with    Physical Exam       History of Present Illness     [facility] [address] RECORD  NO CONCERNS AT THIS TIME        Well Adult Physical   Patient here for a comprehensive physical exam       Diet and Physical Activity  Diet: well balanced diet  Weight concerns: Patient has class 1 obesity (BMI 30-34  9)  Exercise: frequently      Depression Screen  PHQ-2/9 Depression Screening    Little interest or pleasure in doing things: 0 - not at all  Feeling down, depressed, or hopeless: 0 - not at all  PHQ-2 Score: 0  PHQ-2 Interpretation: Negative depression screen          General Health  Hearing: Normal:  bilateral  Vision: no vision problems  Dental: regular dental visits    Reproductive Health          The following portions of the patient's history were reviewed and updated as appropriate: allergies, current medications, past family history, past medical history, past social history, past surgical history and problem list     Review of Systems     Review of Systems   Constitutional: Negative for chills, fatigue and fever  HENT: Negative for congestion, ear discharge, ear pain, mouth sores, postnasal drip, sore throat and trouble swallowing  Eyes: Negative for pain, discharge and visual disturbance  Respiratory: Negative for cough, shortness of breath and wheezing  Cardiovascular: Negative for chest pain, palpitations and leg swelling  Gastrointestinal: Negative for abdominal distention, abdominal pain, blood in stool, diarrhea and nausea  Endocrine: Negative for polydipsia, polyphagia and polyuria  Genitourinary: Negative for dysuria, frequency, hematuria and urgency  Musculoskeletal: Negative for arthralgias, gait problem and joint swelling  Skin: Negative for pallor and rash  Neurological: Negative for dizziness, syncope, speech difficulty, weakness, light-headedness, numbness and headaches  Hematological: Negative for adenopathy  Psychiatric/Behavioral: Negative for behavioral problems, confusion and sleep disturbance  The patient is not nervous/anxious  Past Medical History     History reviewed  No pertinent past medical history      Past Surgical History     Past Surgical History:   Procedure Laterality Date    ADENOIDECTOMY      APPENDECTOMY      GALLBLADDER SURGERY         Social History     Social History     Socioeconomic History    Marital status: /Civil Union     Spouse name: None    Number of children: None    Years of education: None    Highest education level: None   Occupational History    None   Tobacco Use    Smoking status: Never Smoker    Smokeless tobacco: Never Used   Vaping Use    Vaping Use: Never used   Substance and Sexual Activity    Alcohol use: Yes     Comment: Minimum alcohol consumption    Drug use: No    Sexual activity: None   Other Topics Concern    None   Social History Narrative    Caffeine use    Drinks coffee    Patient ingests cola containing caffeine     Social Determinants of Health     Financial Resource Strain: Not on file   Food Insecurity: Not on file   Transportation Needs: Not on file   Physical Activity: Not on file   Stress: Not on file   Social Connections: Not on file   Intimate Partner Violence: Not on file   Housing Stability: Not on file       Family History     Family History   Problem Relation Age of Onset    Coronary artery disease Father 70        CAD involving CABG of native heart with other forms of angina pectoris    Hypertension Father         Essential Hypertension    Heart attack Father         Myocardial Infarction involving other coronary artery    Heart attack Cousin         Myocardial Infarction involving other coronary artery    Mental illness Neg Hx     Substance Abuse Neg Hx        Current Medications       Current Outpatient Medications:     aspirin (ECOTRIN LOW STRENGTH) 81 mg EC tablet, Take 1 tablet (81 mg total) by mouth daily, Disp: 90 tablet, Rfl: 1    atorvastatin (LIPITOR) 20 mg tablet, , Disp: , Rfl:     sildenafil (VIAGRA) 100 mg tablet, Take 1 tablet (100 mg total) by mouth daily as needed for erectile dysfunction, Disp: 10 tablet, Rfl: 3     Allergies     No Known Allergies    Objective     /76   Pulse 59   Temp (!) 97 3 °F (36 3 °C) (Temporal)   Resp 16   Ht 5' 10" (1 778 m)   Wt 105 kg (231 lb)   SpO2 96%   BMI 33 15 kg/m²      Physical Exam  Constitutional:       Appearance: He is well-developed  HENT:      Head: Normocephalic and atraumatic  Right Ear: External ear normal       Left Ear: External ear normal       Nose: Nose normal    Eyes:      General:         Right eye: No discharge  Left eye: No discharge  Conjunctiva/sclera: Conjunctivae normal       Pupils: Pupils are equal, round, and reactive to light  Neck:      Thyroid: No thyromegaly  Vascular: No JVD  Cardiovascular:      Rate and Rhythm: Normal rate and regular rhythm  Heart sounds: Normal heart sounds  No murmur heard  Pulmonary:      Effort: Pulmonary effort is normal       Breath sounds: Normal breath sounds  No wheezing or rales  Abdominal:      General: Bowel sounds are normal       Palpations: Abdomen is soft  There is no mass  Tenderness: There is no abdominal tenderness  There is no guarding or rebound  Genitourinary:     Penis: Normal        Prostate: Normal       Rectum: Normal  Guaiac result negative  Musculoskeletal:         General: No tenderness or deformity  Normal range of motion  Cervical back: Neck supple  Lymphadenopathy:      Cervical: No cervical adenopathy  Skin:     General: Skin is warm and dry  Findings: No erythema or rash  Neurological:      Mental Status: He is alert and oriented to person, place, and time  Cranial Nerves: No cranial nerve deficit  Motor: No abnormal muscle tone  Coordination: Coordination normal       Deep Tendon Reflexes: Reflexes are normal and symmetric  Psychiatric:         Behavior: Behavior normal          Thought Content:  Thought content normal          Judgment: Judgment normal            No exam data present    Health Maintenance     Health Maintenance   Topic Date Due    Influenza Vaccine (1) Never done    Colorectal Cancer Screening  09/26/2022    Depression Screening  04/13/2023    BMI: Followup Plan  04/13/2023    BMI: Adult  04/13/2023    Annual Physical  04/13/2023    DTaP,Tdap,and Td Vaccines (2 - Td or Tdap) 07/02/2028    HIV Screening  Completed    Hepatitis C Screening  Completed    COVID-19 Vaccine  Completed    Pneumococcal Vaccine: Pediatrics (0 to 5 Years) and At-Risk Patients (6 to 59 Years)  Aged Out    HIB Vaccine  Aged Out    Hepatitis B Vaccine  Aged Out    IPV Vaccine  Aged Out    Hepatitis A Vaccine  Aged Out    Meningococcal ACWY Vaccine  Aged Out    HPV Vaccine  Aged [employer] History   Administered Date(s) Administered    COVID-19 PFIZER VACCINE 0 3 ML IM 04/03/2021, 04/24/2021, 12/18/2021    Tdap 07/02/2018       Yenni Woo MD  [facility]

## 2022-04-13 NOTE — PATIENT INSTRUCTIONS
DISCUSSED HEALTH MAINTENENCE ISSUES  BW WILL BE REVIEWED  ENCOURAGED HEALTHY DIET AND EXERCISE  COLONOSCOPY WILL BE ORDERED  RV FOR ANNUAL HEALTH EXAM IN 1 YEAR  RV SOONER IF THERE ARE ANY CONCERNS      Recent Results (from the past 672 hour(s))   CBC and differential    Collection Time: 04/07/22  8:40 AM   Result Value Ref Range    White Blood Cell Count 5 4 3 4 - 10 8 x10E3/uL    Red Blood Cell Count 4 51 4 14 - 5 80 x10E6/uL    Hemoglobin 14 7 13 0 - 17 7 g/dL    HCT 42 2 37 5 - 51 0 %    MCV 94 79 - 97 fL    MCH 32 6 26 6 - 33 0 pg    MCHC 34 8 31 5 - 35 7 g/dL    RDW 12 3 11 6 - 15 4 %    Platelet Count 097 390 - 450 x10E3/uL    Neutrophils 58 Not Estab  %    Lymphocytes 30 Not Estab  %    Monocytes 9 Not Estab  %    Eosinophils 2 Not Estab  %    Basophils PCT 1 Not Estab  %    Neutrophils (Absolute) 3 1 1 4 - 7 0 x10E3/uL    Lymphocytes (Absolute) 1 6 0 7 - 3 1 x10E3/uL    Monocytes (Absolute) 0 5 0 1 - 0 9 x10E3/uL    Eosinophils (Absolute) 0 1 0 0 - 0 4 x10E3/uL    Basophils ABS 0 0 0 0 - 0 2 x10E3/uL    Immature Granulocytes 0 Not Estab  %    Immature Granulocytes (Absolute) 0 0 0 0 - 0 1 x10E3/uL   Comprehensive metabolic panel    Collection Time: 04/07/22  8:40 AM   Result Value Ref Range    Glucose, Random 97 65 - 99 mg/dL    BUN 18 6 - 24 mg/dL    Creatinine 0 86 0 76 - 1 27 mg/dL    eGFR 101 >59 mL/min/1 73    SL AMB BUN/CREATININE RATIO 21 (H) 9 - 20    Sodium 141 134 - 144 mmol/L    Potassium 4 6 3 5 - 5 2 mmol/L    Chloride 104 96 - 106 mmol/L    CO2 24 20 - 29 mmol/L    CALCIUM 9 2 8 7 - 10 2 mg/dL    Protein, Total 6 6 6 0 - 8 5 g/dL    Albumin 4 2 3 8 - 4 9 g/dL    Globulin, Total 2 4 1 5 - 4 5 g/dL    Albumin/Globulin Ratio 1 8 1 2 - 2 2    TOTAL BILIRUBIN 0 6 0 0 - 1 2 mg/dL    Alk Phos Isoenzymes 87 44 - 121 IU/L    AST 21 0 - 40 IU/L    ALT 29 0 - 44 IU/L   Lipid panel    Collection Time: 04/07/22  8:40 AM   Result Value Ref Range    Cholesterol, Total 125 100 - 199 mg/dL    Triglycerides 55 0 - 149 mg/dL    HDL 47 >39 mg/dL    VLDL Cholesterol Calculated 12 5 - 40 mg/dL    LDL Calculated 66 0 - 99 mg/dL    T   Chol/HDL Ratio 2 7 0 0 - 5 0 ratio   PSA Total (Reflex To Free)    Collection Time: 04/07/22  8:40 AM   Result Value Ref Range    Prostate Specific Antigen Total 0 5 0 0 - 4 0 ng/mL    Reflex Criteria Comment    TSH, 3rd generation    Collection Time: 04/07/22  8:40 AM   Result Value Ref Range    TSH 1 010 0 450 - 4 500 uIU/mL

## 2022-04-13 NOTE — LETTER
JAY CAPELLAN      Current Outpatient Medications:     aspirin (ECOTRIN LOW STRENGTH) 81 mg EC tablet, Take 1 tablet (81 mg total) by mouth daily, Disp: 90 tablet, Rfl: 1    atorvastatin (LIPITOR) 20 mg tablet, , Disp: , Rfl:     sildenafil (VIAGRA) 100 mg tablet, Take 1 tablet (100 mg total) by mouth daily as needed for erectile dysfunction, Disp: 10 tablet, Rfl: 3      Recent Results (from the past 672 hour(s))   CBC and differential    Collection Time: 04/07/22  8:40 AM   Result Value Ref Range    White Blood Cell Count 5 4 3 4 - 10 8 x10E3/uL    Red Blood Cell Count 4 51 4 14 - 5 80 x10E6/uL    Hemoglobin 14 7 13 0 - 17 7 g/dL    HCT 42 2 37 5 - 51 0 %    MCV 94 79 - 97 fL    MCH 32 6 26 6 - 33 0 pg    MCHC 34 8 31 5 - 35 7 g/dL    RDW 12 3 11 6 - 15 4 %    Platelet Count 560 165 - 450 x10E3/uL    Neutrophils 58 Not Estab  %    Lymphocytes 30 Not Estab  %    Monocytes 9 Not Estab  %    Eosinophils 2 Not Estab  %    Basophils PCT 1 Not Estab  %    Neutrophils (Absolute) 3 1 1 4 - 7 0 x10E3/uL    Lymphocytes (Absolute) 1 6 0 7 - 3 1 x10E3/uL    Monocytes (Absolute) 0 5 0 1 - 0 9 x10E3/uL    Eosinophils (Absolute) 0 1 0 0 - 0 4 x10E3/uL    Basophils ABS 0 0 0 0 - 0 2 x10E3/uL    Immature Granulocytes 0 Not Estab  %    Immature Granulocytes (Absolute) 0 0 0 0 - 0 1 x10E3/uL   Comprehensive metabolic panel    Collection Time: 04/07/22  8:40 AM   Result Value Ref Range    Glucose, Random 97 65 - 99 mg/dL    BUN 18 6 - 24 mg/dL    Creatinine 0 86 0 76 - 1 27 mg/dL    eGFR 101 >59 mL/min/1 73    SL AMB BUN/CREATININE RATIO 21 (H) 9 - 20    Sodium 141 134 - 144 mmol/L    Potassium 4 6 3 5 - 5 2 mmol/L    Chloride 104 96 - 106 mmol/L    CO2 24 20 - 29 mmol/L    CALCIUM 9 2 8 7 - 10 2 mg/dL    Protein, Total 6 6 6 0 - 8 5 g/dL    Albumin 4 2 3 8 - 4 9 g/dL    Globulin, Total 2 4 1 5 - 4 5 g/dL    Albumin/Globulin Ratio 1 8 1 2 - 2 2    TOTAL BILIRUBIN 0 6 0 0 - 1 2 mg/dL    Alk Phos Isoenzymes 87 44 - 121 IU/L AST 21 0 - 40 IU/L    ALT 29 0 - 44 IU/L   Lipid panel    Collection Time: 04/07/22  8:40 AM   Result Value Ref Range    Cholesterol, Total 125 100 - 199 mg/dL    Triglycerides 55 0 - 149 mg/dL    HDL 47 >39 mg/dL    VLDL Cholesterol Calculated 12 5 - 40 mg/dL    LDL Calculated 66 0 - 99 mg/dL    T   Chol/HDL Ratio 2 7 0 0 - 5 0 ratio   PSA Total (Reflex To Free)    Collection Time: 04/07/22  8:40 AM   Result Value Ref Range    Prostate Specific Antigen Total 0 5 0 0 - 4 0 ng/mL    Reflex Criteria Comment    TSH, 3rd generation    Collection Time: 04/07/22  8:40 AM   Result Value Ref Range    TSH 1 010 0 450 - 4 500 uIU/mL

## 2022-06-09 DIAGNOSIS — N52.9 ERECTILE DYSFUNCTION, UNSPECIFIED ERECTILE DYSFUNCTION TYPE: ICD-10-CM

## 2022-06-09 RX ORDER — SILDENAFIL 100 MG/1
TABLET, FILM COATED ORAL
Qty: 10 TABLET | Refills: 3 | Status: SHIPPED | OUTPATIENT
Start: 2022-06-09

## 2022-09-02 ENCOUNTER — TELEPHONE (OUTPATIENT)
Dept: FAMILY MEDICINE CLINIC | Facility: CLINIC | Age: 57
End: 2022-09-02

## 2022-09-06 DIAGNOSIS — E78.2 MIXED HYPERLIPIDEMIA: Primary | ICD-10-CM

## 2022-09-13 ENCOUNTER — TELEPHONE (OUTPATIENT)
Dept: FAMILY MEDICINE CLINIC | Facility: CLINIC | Age: 57
End: 2022-09-13

## 2022-09-13 DIAGNOSIS — Z91.013 SEAFOOD ALLERGY: Primary | ICD-10-CM

## 2022-09-13 RX ORDER — EPINEPHRINE 0.3 MG/.3ML
0.3 INJECTION SUBCUTANEOUS ONCE
Qty: 0.6 ML | Refills: 0 | Status: SHIPPED | OUTPATIENT
Start: 2022-09-13 | End: 2023-06-01

## 2022-09-13 NOTE — TELEPHONE ENCOUNTER
Renee Beth states [facility] spoke to you last week about Renee Ross having an allergic rx to crab cakes    Please call in an Epi Pen to   [facility] [place]

## 2022-10-05 ENCOUNTER — RA CDI HCC (OUTPATIENT)
Dept: OTHER | Facility: HOSPITAL | Age: 57
End: 2022-10-05

## 2022-10-05 NOTE — PROGRESS NOTES
[facility]  coding opportunities       Chart reviewed, no opportunity found: CHART REVIEWED, NO OPPORTUNITY FOUND        Patients Insurance        Commercial Insurance: Jennifer Andujar

## 2022-10-07 LAB
ALBUMIN SERPL-MCNC: 4.5 G/DL (ref 3.8–4.9)
ALBUMIN/GLOB SERPL: 1.8 {RATIO} (ref 1.2–2.2)
ALP SERPL-CCNC: 93 IU/L (ref 44–121)
ALT SERPL-CCNC: 29 IU/L (ref 0–44)
AST SERPL-CCNC: 20 IU/L (ref 0–40)
BILIRUB SERPL-MCNC: 0.8 MG/DL (ref 0–1.2)
BUN SERPL-MCNC: 16 MG/DL (ref 6–24)
BUN/CREAT SERPL: 18 (ref 9–20)
CALCIUM SERPL-MCNC: 9.9 MG/DL (ref 8.7–10.2)
CHLORIDE SERPL-SCNC: 103 MMOL/L (ref 96–106)
CHOLEST SERPL-MCNC: 135 MG/DL (ref 100–199)
CHOLEST/HDLC SERPL: 2.6 RATIO (ref 0–5)
CO2 SERPL-SCNC: 26 MMOL/L (ref 20–29)
CREAT SERPL-MCNC: 0.87 MG/DL (ref 0.76–1.27)
EGFR: 100 ML/MIN/1.73
GLOBULIN SER-MCNC: 2.5 G/DL (ref 1.5–4.5)
GLUCOSE SERPL-MCNC: 94 MG/DL (ref 70–99)
HDLC SERPL-MCNC: 51 MG/DL
LDLC SERPL CALC-MCNC: 71 MG/DL (ref 0–99)
POTASSIUM SERPL-SCNC: 4.8 MMOL/L (ref 3.5–5.2)
PROT SERPL-MCNC: 7 G/DL (ref 6–8.5)
SL AMB VLDL CHOLESTEROL CALC: 13 MG/DL (ref 5–40)
SODIUM SERPL-SCNC: 141 MMOL/L (ref 134–144)
TRIGL SERPL-MCNC: 62 MG/DL (ref 0–149)

## 2022-10-12 ENCOUNTER — OFFICE VISIT (OUTPATIENT)
Dept: FAMILY MEDICINE CLINIC | Facility: CLINIC | Age: 57
End: 2022-10-12
Payer: COMMERCIAL

## 2022-10-12 VITALS
DIASTOLIC BLOOD PRESSURE: 92 MMHG | SYSTOLIC BLOOD PRESSURE: 130 MMHG | WEIGHT: 212 LBS | BODY MASS INDEX: 30.35 KG/M2 | HEIGHT: 70 IN | TEMPERATURE: 98.1 F | HEART RATE: 72 BPM | OXYGEN SATURATION: 98 % | RESPIRATION RATE: 12 BRPM

## 2022-10-12 DIAGNOSIS — Z12.11 COLON CANCER SCREENING: ICD-10-CM

## 2022-10-12 DIAGNOSIS — E78.2 MIXED HYPERLIPIDEMIA: Primary | ICD-10-CM

## 2022-10-12 DIAGNOSIS — I77.810 DILATED AORTIC ROOT (HCC): ICD-10-CM

## 2022-10-12 DIAGNOSIS — R93.1 ELEVATED CORONARY ARTERY CALCIUM SCORE: ICD-10-CM

## 2022-10-12 PROCEDURE — 99214 OFFICE O/P EST MOD 30 MIN: CPT | Performed by: FAMILY MEDICINE

## 2022-10-12 RX ORDER — ATORVASTATIN CALCIUM 20 MG/1
20 TABLET, FILM COATED ORAL DAILY
Qty: 90 TABLET | Refills: 3 | Status: SHIPPED | OUTPATIENT
Start: 2022-10-12

## 2022-10-12 NOTE — LETTER
LAINEY CAPELLAN      Current Outpatient Medications:   •  aspirin (ECOTRIN LOW STRENGTH) 81 mg EC tablet, Take 1 tablet (81 mg total) by mouth daily, Disp: 90 tablet, Rfl: 1  •  atorvastatin (LIPITOR) 20 mg tablet, , Disp: , Rfl:   •  EPINEPHrine (EPIPEN) 0 3 mg/0 3 mL SOAJ, Inject 0 3 mL (0 3 mg total) into a muscle once for 1 dose, Disp: 0 6 mL, Rfl: 0  •  sildenafil (VIAGRA) 100 mg tablet, TAKE ONE TABLET BY MOUTH EVERY DAY AS NEEDED FOR ERECTILE DYSFUNCTION, Disp: 10 tablet, Rfl: 3    Recent Results (from the past 672 hour(s))   Lipid panel    Collection Time: 10/07/22 11:21 AM   Result Value Ref Range    Cholesterol, Total 135 100 - 199 mg/dL    Triglycerides 62 0 - 149 mg/dL    HDL 51 >39 mg/dL    VLDL Cholesterol Calculated 13 5 - 40 mg/dL    LDL Calculated 71 0 - 99 mg/dL    T   Chol/HDL Ratio 2 6 0 0 - 5 0 ratio   Comprehensive metabolic panel    Collection Time: 10/07/22 11:21 AM   Result Value Ref Range    Glucose, Random 94 70 - 99 mg/dL    BUN 16 6 - 24 mg/dL    Creatinine 0 87 0 76 - 1 27 mg/dL    eGFR 100 >59 mL/min/1 73    SL AMB BUN/CREATININE RATIO 18 9 - 20    Sodium 141 134 - 144 mmol/L    Potassium 4 8 3 5 - 5 2 mmol/L    Chloride 103 96 - 106 mmol/L    CO2 26 20 - 29 mmol/L    CALCIUM 9 9 8 7 - 10 2 mg/dL    Protein, Total 7 0 6 0 - 8 5 g/dL    Albumin 4 5 3 8 - 4 9 g/dL    Globulin, Total 2 5 1 5 - 4 5 g/dL    Albumin/Globulin Ratio 1 8 1 2 - 2 2    TOTAL BILIRUBIN 0 8 0 0 - 1 2 mg/dL    Alk Phos Isoenzymes 93 44 - 121 IU/L    AST 20 0 - 40 IU/L    ALT 29 0 - 44 IU/L

## 2022-10-12 NOTE — PATIENT INSTRUCTIONS
CONTINUE CURRENT TREATMENT PLAN  MONITOR DIETARY SODIUM, CHOL INTAKE  ENCOURAGE PHYSICAL ACTIVITY      RV 6 M, SOONER PRN    Recent Results (from the past 672 hour(s))   Lipid panel    Collection Time: 10/07/22 11:21 AM   Result Value Ref Range    Cholesterol, Total 135 100 - 199 mg/dL    Triglycerides 62 0 - 149 mg/dL    HDL 51 >39 mg/dL    VLDL Cholesterol Calculated 13 5 - 40 mg/dL    LDL Calculated 71 0 - 99 mg/dL    T   Chol/HDL Ratio 2 6 0 0 - 5 0 ratio   Comprehensive metabolic panel    Collection Time: 10/07/22 11:21 AM   Result Value Ref Range    Glucose, Random 94 70 - 99 mg/dL    BUN 16 6 - 24 mg/dL    Creatinine 0 87 0 76 - 1 27 mg/dL    eGFR 100 >59 mL/min/1 73    SL AMB BUN/CREATININE RATIO 18 9 - 20    Sodium 141 134 - 144 mmol/L    Potassium 4 8 3 5 - 5 2 mmol/L    Chloride 103 96 - 106 mmol/L    CO2 26 20 - 29 mmol/L    CALCIUM 9 9 8 7 - 10 2 mg/dL    Protein, Total 7 0 6 0 - 8 5 g/dL    Albumin 4 5 3 8 - 4 9 g/dL    Globulin, Total 2 5 1 5 - 4 5 g/dL    Albumin/Globulin Ratio 1 8 1 2 - 2 2    TOTAL BILIRUBIN 0 8 0 0 - 1 2 mg/dL    Alk Phos Isoenzymes 93 44 - 121 IU/L    AST 20 0 - 40 IU/L    ALT 29 0 - 44 IU/L

## 2022-10-12 NOTE — PROGRESS NOTES
Name: Tapan Grossman      : 1965      MRN: [id number]  Encounter Provider: Rafa Dodge MD  Encounter Date: 10/12/2022   Encounter department: [facility]     1  Mixed hyperlipidemia  Assessment & Plan:  WATCHING CHOLESTEROL INTAKE  COMPLIANT WITH MEDICATION  NO CONCERNS    - CONTINUE TO MONITOR DIETARY CHOL INTAKE  - CONTINUE CURRENT MEDICATION  - ENCOURAGED PHYSICAL ACTIVITY          2  Elevated coronary artery calcium score  Assessment & Plan:  FOLLOWED BY CARDIOLOGY      3  Dilated aortic root ([address] )  Assessment & Plan:  FOLLOWED BY CARDIOLOGY      4  Colon cancer screening  -     Ambulatory referral for colonoscopy; Future           Subjective      PATIENT RETURNS FOR ROUTINE EVALUATION OF PATIENT'S MEDICAL ISSUES    INDIVIDUAL MEDICAL ISSUES WITH THEIR CURRENT STATUS, ASSESSMENT AND PLANS ARE LISTED ABOVE        Review of Systems   Constitutional: Negative for chills, fatigue and fever  HENT: Negative for congestion, ear discharge, ear pain, mouth sores, postnasal drip, sore throat and trouble swallowing  Eyes: Negative for pain, discharge and visual disturbance  Respiratory: Negative for cough, shortness of breath and wheezing  Cardiovascular: Negative for chest pain, palpitations and leg swelling  Gastrointestinal: Negative for abdominal distention, abdominal pain, blood in stool, diarrhea and nausea  Endocrine: Negative for polydipsia, polyphagia and polyuria  Genitourinary: Negative for dysuria, frequency, hematuria and urgency  Musculoskeletal: Negative for arthralgias, gait problem and joint swelling  Skin: Negative for pallor and rash  Neurological: Negative for dizziness, syncope, speech difficulty, weakness, light-headedness, numbness and headaches  Hematological: Negative for adenopathy  Psychiatric/Behavioral: Negative for behavioral problems, confusion and sleep disturbance  The patient is not nervous/anxious  Current Outpatient Medications on File Prior to Visit   Medication Sig   • aspirin (ECOTRIN LOW STRENGTH) 81 mg EC tablet Take 1 tablet (81 mg total) by mouth daily   • atorvastatin (LIPITOR) 20 mg tablet    • EPINEPHrine (EPIPEN) 0 3 mg/0 3 mL SOAJ Inject 0 3 mL (0 3 mg total) into a muscle once for 1 dose   • sildenafil (VIAGRA) 100 mg tablet TAKE ONE TABLET BY MOUTH EVERY DAY AS NEEDED FOR ERECTILE DYSFUNCTION       Objective     /92 (BP Location: Right arm, Patient Position: Sitting, Cuff Size: Large)   Pulse 72   Temp 98 1 °F (36 7 °C) (Temporal)   Resp 12   Ht 5' 10" (1 778 m)   Wt 96 2 kg (212 lb)   SpO2 98%   BMI 30 42 kg/m²     Physical Exam  Constitutional:       Appearance: He is well-developed  HENT:      Head: Normocephalic and atraumatic  Eyes:      General:         Right eye: No discharge  Left eye: No discharge  Conjunctiva/sclera: Conjunctivae normal       Pupils: Pupils are equal, round, and reactive to light  Neck:      Thyroid: No thyromegaly  Vascular: No JVD  Cardiovascular:      Rate and Rhythm: Normal rate and regular rhythm  Heart sounds: Normal heart sounds  No murmur heard  Pulmonary:      Effort: Pulmonary effort is normal       Breath sounds: Normal breath sounds  No wheezing or rales  Abdominal:      General: Bowel sounds are normal       Palpations: Abdomen is soft  There is no mass  Tenderness: There is no abdominal tenderness  There is no guarding or rebound  Musculoskeletal:         General: No tenderness or deformity  Normal range of motion  Cervical back: Neck supple  Lymphadenopathy:      Cervical: No cervical adenopathy  Skin:     General: Skin is warm and dry  Findings: No erythema or rash  Neurological:      Mental Status: He is alert and oriented to person, place, and time  Psychiatric:         Behavior: Behavior normal          Thought Content:  Thought content normal          Judgment: Judgment normal        Emeli Foster MD

## 2023-03-20 DIAGNOSIS — E78.2 MIXED HYPERLIPIDEMIA: ICD-10-CM

## 2023-03-20 DIAGNOSIS — Z13.6 SCREENING FOR HYPERTENSION: ICD-10-CM

## 2023-03-20 DIAGNOSIS — Z12.5 ENCOUNTER FOR PROSTATE CANCER SCREENING: ICD-10-CM

## 2023-03-20 DIAGNOSIS — Z00.00 ROUTINE GENERAL MEDICAL EXAMINATION AT A HEALTH CARE FACILITY: Primary | ICD-10-CM

## 2023-03-20 DIAGNOSIS — Z13.29 SCREENING FOR HYPOTHYROIDISM: ICD-10-CM

## 2023-03-22 ENCOUNTER — TELEPHONE (OUTPATIENT)
Dept: GASTROENTEROLOGY | Facility: CLINIC | Age: 58
End: 2023-03-22

## 2023-03-22 ENCOUNTER — PREP FOR PROCEDURE (OUTPATIENT)
Dept: GASTROENTEROLOGY | Facility: CLINIC | Age: 58
End: 2023-03-22

## 2023-03-22 DIAGNOSIS — Z86.010 HISTORY OF COLON POLYPS: Primary | ICD-10-CM

## 2023-03-22 NOTE — TELEPHONE ENCOUNTER
Scheduled date of colonoscopy (as of today):4/17/23    Physician performing colonoscopy:Dr Borges    Location of colonoscopy:[facility]    Clearances: N/A

## 2023-04-03 ENCOUNTER — TELEPHONE (OUTPATIENT)
Dept: GASTROENTEROLOGY | Facility: CLINIC | Age: 58
End: 2023-04-03

## 2023-04-03 NOTE — TELEPHONE ENCOUNTER
Spoke to pt confirming pt's colonoscopy scheduled on 4/17/23 at [facility] with Dr Sindhu Nguyen  Informed [facility] would be calling the day prior with the arrival time  Informed of clear liquid diet day prior as well as the bowel cleansing preparation  Informed would need a  the day of the procedure due to being under sedation  Informed pt that I would be emailing the instructions for the miralax w/ dulcolax, gatorade prep which is all otc  I asked pt to please call back if has any questions

## 2023-04-13 NOTE — TELEPHONE ENCOUNTER
Patient states he has not received his instructions for procedure  He looked in [place] but did not see it      Please email directly to him, confirmed email

## 2023-04-17 PROBLEM — E66.811 CLASS 1 OBESITY IN ADULT: Status: ACTIVE | Noted: 2023-04-17

## 2023-04-17 PROBLEM — E66.9 CLASS 1 OBESITY IN ADULT: Status: ACTIVE | Noted: 2023-04-17

## 2023-05-14 DIAGNOSIS — N52.9 ERECTILE DYSFUNCTION, UNSPECIFIED ERECTILE DYSFUNCTION TYPE: ICD-10-CM

## 2023-05-15 RX ORDER — SILDENAFIL 100 MG/1
TABLET, FILM COATED ORAL
Qty: 10 TABLET | Refills: 3 | Status: SHIPPED | OUTPATIENT
Start: 2023-05-15

## 2023-06-01 ENCOUNTER — OFFICE VISIT (OUTPATIENT)
Dept: FAMILY MEDICINE CLINIC | Facility: CLINIC | Age: 58
End: 2023-06-01

## 2023-06-01 VITALS
DIASTOLIC BLOOD PRESSURE: 80 MMHG | TEMPERATURE: 97.2 F | HEART RATE: 63 BPM | RESPIRATION RATE: 12 BRPM | WEIGHT: 213 LBS | SYSTOLIC BLOOD PRESSURE: 122 MMHG | BODY MASS INDEX: 30.49 KG/M2 | HEIGHT: 70 IN | OXYGEN SATURATION: 97 %

## 2023-06-01 DIAGNOSIS — E78.2 MIXED HYPERLIPIDEMIA: ICD-10-CM

## 2023-06-01 DIAGNOSIS — R03.0 ELEVATED BP WITHOUT DIAGNOSIS OF HYPERTENSION: Primary | ICD-10-CM

## 2023-06-01 NOTE — PROGRESS NOTES
Name: Kasi Beasley      : 1965      MRN: [id number]  Encounter Provider: Danica Patrick MD  Encounter Date: 2023   Encounter department: [facility]     1  Elevated BP without diagnosis of hypertension    2  Mixed hyperlipidemia           Subjective      PATIENT RETURNS FOR ROUTINE EVALUATION OF PATIENT'S MEDICAL ISSUES    INDIVIDUAL MEDICAL ISSUES WITH THEIR CURRENT STATUS, ASSESSMENT AND PLANS ARE LISTED ABOVE        Review of Systems   Constitutional: Negative for chills, fatigue and fever  HENT: Negative for congestion, ear discharge, ear pain, mouth sores, postnasal drip, sore throat and trouble swallowing  Eyes: Negative for pain, discharge and visual disturbance  Respiratory: Negative for cough, shortness of breath and wheezing  Cardiovascular: Negative for chest pain, palpitations and leg swelling  Gastrointestinal: Negative for abdominal distention, abdominal pain, blood in stool, diarrhea and nausea  Endocrine: Negative for polydipsia, polyphagia and polyuria  Genitourinary: Negative for dysuria, frequency, hematuria and urgency  Musculoskeletal: Negative for arthralgias, gait problem and joint swelling  Skin: Negative for pallor and rash  Neurological: Negative for dizziness, syncope, speech difficulty, weakness, light-headedness, numbness and headaches  Hematological: Negative for adenopathy  Psychiatric/Behavioral: Negative for behavioral problems, confusion and sleep disturbance  The patient is not nervous/anxious          Current Outpatient Medications on File Prior to Visit   Medication Sig   • aspirin (ECOTRIN LOW STRENGTH) 81 mg EC tablet Take 1 tablet (81 mg total) by mouth daily   • atorvastatin (LIPITOR) 20 mg tablet Take 1 tablet (20 mg total) by mouth daily   • EPINEPHrine (EPIPEN) 0 3 mg/0 3 mL SOAJ Inject 0 3 mL (0 3 mg total) into a muscle once for 1 dose   • sildenafil (VIAGRA) 100 mg tablet TAKE "ONE TABLET BY MOUTH ONCE DAILY AS NEEDED FOR ERECTILE DYSFUNCTION       Objective     /80 (BP Location: Left arm, Patient Position: Sitting, Cuff Size: Large)   Pulse 63   Temp (!) 97 2 °F (36 2 °C) (Temporal)   Resp 12   Ht 5' 10\" (1 778 m)   Wt 96 6 kg (213 lb)   SpO2 97%   BMI 30 56 kg/m²     Physical Exam  Constitutional:       Appearance: He is well-developed  HENT:      Head: Normocephalic and atraumatic  Eyes:      General:         Right eye: No discharge  Left eye: No discharge  Conjunctiva/sclera: Conjunctivae normal       Pupils: Pupils are equal, round, and reactive to light  Neck:      Thyroid: No thyromegaly  Vascular: No JVD  Cardiovascular:      Rate and Rhythm: Normal rate and regular rhythm  Heart sounds: Normal heart sounds  No murmur heard  Pulmonary:      Effort: Pulmonary effort is normal       Breath sounds: Normal breath sounds  No wheezing or rales  Abdominal:      General: Bowel sounds are normal       Palpations: Abdomen is soft  There is no mass  Tenderness: There is no abdominal tenderness  There is no guarding or rebound  Musculoskeletal:         General: No tenderness or deformity  Normal range of motion  Cervical back: Neck supple  Lymphadenopathy:      Cervical: No cervical adenopathy  Skin:     General: Skin is warm and dry  Findings: No erythema or rash  Neurological:      General: No focal deficit present  Mental Status: He is alert and oriented to person, place, and time  Psychiatric:         Mood and Affect: Mood normal          Behavior: Behavior normal          Thought Content:  Thought content normal          Judgment: Judgment normal        Indra Mckeon MD  "

## 2023-06-01 NOTE — PATIENT INSTRUCTIONS
CONTINUE CURRENT TREATMENT PLAN  MONITOR DIETARY SODIUM, CHOL INTAKE  ENCOURAGE PHYSICAL ACTIVITY      RV 6 M, SOONER PRN Transcranial Doppler    Transcranial Doppler was performed on this patient for the evaluation of subarachnoid hemorrhage in the follow-up evaluation    Insonation was performed via the transtemporal window and via the foramen magnum window for posterior fossa. Antegrade flow continues to be noted in the intracranial vasculature. There has been no overall change in the study values since yesterday. There is no evidence of intracranial vascular spasm.     Impression stable study    Note that the data for this study was acquired on 7/29/2019

## 2023-12-29 DIAGNOSIS — E78.2 MIXED HYPERLIPIDEMIA: ICD-10-CM

## 2024-01-02 RX ORDER — ATORVASTATIN CALCIUM 20 MG/1
20 TABLET, FILM COATED ORAL DAILY
Qty: 90 TABLET | Refills: 0 | Status: SHIPPED | OUTPATIENT
Start: 2024-01-02

## 2024-02-21 PROBLEM — Z13.29 SCREENING FOR HYPOTHYROIDISM: Status: RESOLVED | Noted: 2020-02-17 | Resolved: 2024-02-21

## 2024-02-21 PROBLEM — Z12.5 ENCOUNTER FOR PROSTATE CANCER SCREENING: Status: RESOLVED | Noted: 2020-02-17 | Resolved: 2024-02-21

## 2024-02-21 PROBLEM — Z13.6 SCREENING FOR HYPERTENSION: Status: RESOLVED | Noted: 2020-02-17 | Resolved: 2024-02-21

## 2024-03-20 ENCOUNTER — TELEPHONE (OUTPATIENT)
Age: 59
End: 2024-03-20

## 2024-03-20 DIAGNOSIS — E78.2 MIXED HYPERLIPIDEMIA: Primary | ICD-10-CM

## 2024-03-20 DIAGNOSIS — Z13.29 SCREENING FOR HYPOTHYROIDISM: ICD-10-CM

## 2024-03-20 DIAGNOSIS — Z12.5 ENCOUNTER FOR PROSTATE CANCER SCREENING: ICD-10-CM

## 2024-03-20 DIAGNOSIS — Z13.6 SCREENING FOR HYPERTENSION: ICD-10-CM

## 2024-03-20 DIAGNOSIS — Z00.00 ROUTINE GENERAL MEDICAL EXAMINATION AT A HEALTH CARE FACILITY: ICD-10-CM

## 2024-03-20 NOTE — TELEPHONE ENCOUNTER
Pt's wife called and is requesting lab orders be mailed to pt's house so he can go to [facility] and have labs done prior to his upcoming PE on 4/15.  Please order and mail.

## 2024-03-21 DIAGNOSIS — E78.2 MIXED HYPERLIPIDEMIA: ICD-10-CM

## 2024-03-21 RX ORDER — ATORVASTATIN CALCIUM 20 MG/1
20 TABLET, FILM COATED ORAL DAILY
Qty: 90 TABLET | Refills: 0 | Status: SHIPPED | OUTPATIENT
Start: 2024-03-21

## 2024-04-03 LAB
BASOPHILS # BLD AUTO: 0 X10E3/UL (ref 0–0.2)
BASOPHILS NFR BLD AUTO: 1 %
EOSINOPHIL # BLD AUTO: 0.2 X10E3/UL (ref 0–0.4)
EOSINOPHIL NFR BLD AUTO: 3 %
ERYTHROCYTE [DISTWIDTH] IN BLOOD BY AUTOMATED COUNT: 11.9 % (ref 11.6–15.4)
HCT VFR BLD AUTO: 44.5 % (ref 37.5–51)
HGB BLD-MCNC: 14.7 G/DL (ref 13–17.7)
IMM GRANULOCYTES # BLD: 0 X10E3/UL (ref 0–0.1)
IMM GRANULOCYTES NFR BLD: 0 %
LYMPHOCYTES # BLD AUTO: 1.7 X10E3/UL (ref 0.7–3.1)
LYMPHOCYTES NFR BLD AUTO: 34 %
MCH RBC QN AUTO: 32 PG (ref 26.6–33)
MCHC RBC AUTO-ENTMCNC: 33 G/DL (ref 31.5–35.7)
MCV RBC AUTO: 97 FL (ref 79–97)
MONOCYTES # BLD AUTO: 0.5 X10E3/UL (ref 0.1–0.9)
MONOCYTES NFR BLD AUTO: 9 %
NEUTROPHILS # BLD AUTO: 2.6 X10E3/UL (ref 1.4–7)
NEUTROPHILS NFR BLD AUTO: 53 %
PLATELET # BLD AUTO: 218 X10E3/UL (ref 150–450)
RBC # BLD AUTO: 4.6 X10E6/UL (ref 4.14–5.8)
WBC # BLD AUTO: 5 X10E3/UL (ref 3.4–10.8)

## 2024-04-04 LAB
ALBUMIN SERPL-MCNC: 4.1 G/DL (ref 3.8–4.9)
ALBUMIN/GLOB SERPL: 1.8 {RATIO} (ref 1.2–2.2)
ALP SERPL-CCNC: 92 IU/L (ref 44–121)
ALT SERPL-CCNC: 20 IU/L (ref 0–44)
AST SERPL-CCNC: 17 IU/L (ref 0–40)
BILIRUB SERPL-MCNC: 0.5 MG/DL (ref 0–1.2)
BUN SERPL-MCNC: 13 MG/DL (ref 6–24)
BUN/CREAT SERPL: 15 (ref 9–20)
CALCIUM SERPL-MCNC: 9.1 MG/DL (ref 8.7–10.2)
CHLORIDE SERPL-SCNC: 103 MMOL/L (ref 96–106)
CHOLEST SERPL-MCNC: 128 MG/DL (ref 100–199)
CHOLEST/HDLC SERPL: 2.5 RATIO (ref 0–5)
CO2 SERPL-SCNC: 26 MMOL/L (ref 20–29)
CREAT SERPL-MCNC: 0.84 MG/DL (ref 0.76–1.27)
EGFR: 100 ML/MIN/1.73
GLOBULIN SER-MCNC: 2.3 G/DL (ref 1.5–4.5)
GLUCOSE SERPL-MCNC: 101 MG/DL (ref 70–99)
HDLC SERPL-MCNC: 52 MG/DL
LDLC SERPL CALC-MCNC: 64 MG/DL (ref 0–99)
POTASSIUM SERPL-SCNC: 4.7 MMOL/L (ref 3.5–5.2)
PROT SERPL-MCNC: 6.4 G/DL (ref 6–8.5)
PSA SERPL-MCNC: 0.5 NG/ML (ref 0–4)
SL AMB REFLEX CRITERIA: NORMAL
SL AMB VLDL CHOLESTEROL CALC: 12 MG/DL (ref 5–40)
SODIUM SERPL-SCNC: 141 MMOL/L (ref 134–144)
TRIGL SERPL-MCNC: 57 MG/DL (ref 0–149)
TSH SERPL DL<=0.005 MIU/L-ACNC: 1.11 UIU/ML (ref 0.45–4.5)

## 2024-04-15 ENCOUNTER — OFFICE VISIT (OUTPATIENT)
Dept: FAMILY MEDICINE CLINIC | Facility: CLINIC | Age: 59
End: 2024-04-15
Payer: COMMERCIAL

## 2024-04-15 VITALS
SYSTOLIC BLOOD PRESSURE: 128 MMHG | OXYGEN SATURATION: 98 % | WEIGHT: 214 LBS | BODY MASS INDEX: 30.64 KG/M2 | TEMPERATURE: 97.2 F | HEIGHT: 70 IN | HEART RATE: 66 BPM | DIASTOLIC BLOOD PRESSURE: 94 MMHG | RESPIRATION RATE: 14 BRPM

## 2024-04-15 DIAGNOSIS — Z12.5 ENCOUNTER FOR PROSTATE CANCER SCREENING: ICD-10-CM

## 2024-04-15 DIAGNOSIS — Z13.29 SCREENING FOR HYPOTHYROIDISM: ICD-10-CM

## 2024-04-15 DIAGNOSIS — R03.0 ELEVATED BP WITHOUT DIAGNOSIS OF HYPERTENSION: ICD-10-CM

## 2024-04-15 DIAGNOSIS — Z00.00 ROUTINE GENERAL MEDICAL EXAMINATION AT A HEALTH CARE FACILITY: Primary | ICD-10-CM

## 2024-04-15 DIAGNOSIS — Z12.11 COLON CANCER SCREENING: ICD-10-CM

## 2024-04-15 DIAGNOSIS — R93.1 ELEVATED CORONARY ARTERY CALCIUM SCORE: ICD-10-CM

## 2024-04-15 DIAGNOSIS — I77.810 DILATED AORTIC ROOT (HCC): ICD-10-CM

## 2024-04-15 DIAGNOSIS — E78.2 MIXED HYPERLIPIDEMIA: ICD-10-CM

## 2024-04-15 LAB — SL AMB POCT FECES OCC BLD: NORMAL

## 2024-04-15 PROCEDURE — 82270 OCCULT BLOOD FECES: CPT | Performed by: FAMILY MEDICINE

## 2024-04-15 PROCEDURE — 99396 PREV VISIT EST AGE 40-64: CPT | Performed by: FAMILY MEDICINE

## 2024-04-15 PROCEDURE — 93000 ELECTROCARDIOGRAM COMPLETE: CPT | Performed by: FAMILY MEDICINE

## 2024-04-15 NOTE — PATIENT INSTRUCTIONS
DISCUSSED HEALTH MAINTENENCE ISSUES  BW WILL BE REVIEWED  ENCOURAGED HEALTHY DIET AND EXERCISE  COLONOSCOPY WILL BE ORDERED  RV FOR ANNUAL HEALTH EXAM IN 1 YEAR  RV SOONER IF THERE ARE ANY CONCERNS      RV 6M    Recent Results (from the past 672 hour(s))   CBC and differential    Collection Time: 04/03/24  8:45 AM   Result Value Ref Range    White Blood Cell Count 5.0 3.4 - 10.8 x10E3/uL    Red Blood Cell Count 4.60 4.14 - 5.80 x10E6/uL    Hemoglobin 14.7 13.0 - 17.7 g/dL    HCT 44.5 37.5 - 51.0 %    MCV 97 79 - 97 fL    MCH 32.0 26.6 - 33.0 pg    MCHC 33.0 31.5 - 35.7 g/dL    RDW 11.9 11.6 - 15.4 %    Platelet Count 218 150 - 450 x10E3/uL    Neutrophils 53 Not Estab. %    Lymphocytes 34 Not Estab. %    Monocytes 9 Not Estab. %    Eosinophils 3 Not Estab. %    Basophils PCT 1 Not Estab. %    Neutrophils (Absolute) 2.6 1.4 - 7.0 x10E3/uL    Lymphocytes (Absolute) 1.7 0.7 - 3.1 x10E3/uL    Monocytes (Absolute) 0.5 0.1 - 0.9 x10E3/uL    Eosinophils (Absolute) 0.2 0.0 - 0.4 x10E3/uL    Basophils ABS 0.0 0.0 - 0.2 x10E3/uL    Immature Granulocytes 0 Not Estab. %    Immature Granulocytes (Absolute) 0.0 0.0 - 0.1 x10E3/uL   Comprehensive metabolic panel    Collection Time: 04/03/24  8:45 AM   Result Value Ref Range    Glucose, Random 101 (H) 70 - 99 mg/dL    BUN 13 6 - 24 mg/dL    Creatinine 0.84 0.76 - 1.27 mg/dL    eGFR 100 >59 mL/min/1.73    SL AMB BUN/CREATININE RATIO 15 9 - 20    Sodium 141 134 - 144 mmol/L    Potassium 4.7 3.5 - 5.2 mmol/L    Chloride 103 96 - 106 mmol/L    CO2 26 20 - 29 mmol/L    CALCIUM 9.1 8.7 - 10.2 mg/dL    Protein, Total 6.4 6.0 - 8.5 g/dL    Albumin 4.1 3.8 - 4.9 g/dL    Globulin, Total 2.3 1.5 - 4.5 g/dL    Albumin/Globulin Ratio 1.8 1.2 - 2.2    TOTAL BILIRUBIN 0.5 0.0 - 1.2 mg/dL    Alk Phos Isoenzymes 92 44 - 121 IU/L    AST 17 0 - 40 IU/L    ALT 20 0 - 44 IU/L   Lipid panel    Collection Time: 04/03/24  8:45 AM   Result Value Ref Range    Cholesterol, Total 128 100 - 199 mg/dL     Triglycerides 57 0 - 149 mg/dL    HDL 52 >39 mg/dL    VLDL Cholesterol Calculated 12 5 - 40 mg/dL    LDL Calculated 64 0 - 99 mg/dL    T. Chol/HDL Ratio 2.5 0.0 - 5.0 ratio   PSA Total (Reflex To Free)    Collection Time: 04/03/24  8:45 AM   Result Value Ref Range    Prostate Specific Antigen Total 0.5 0.0 - 4.0 ng/mL    Reflex Criteria Comment    TSH, 3rd generation    Collection Time: 04/03/24  8:45 AM   Result Value Ref Range    TSH 1.110 0.450 - 4.500 uIU/mL

## 2024-04-15 NOTE — LETTER
LAINEY CAPELLAN      Current Outpatient Medications:     aspirin (ECOTRIN LOW STRENGTH) 81 mg EC tablet, Take 1 tablet (81 mg total) by mouth daily, Disp: 90 tablet, Rfl: 1    atorvastatin (LIPITOR) 20 mg tablet, TAKE ONE TABLET BY MOUTH EVERY DAY, Disp: 90 tablet, Rfl: 0    EPINEPHrine (EPIPEN) 0.3 mg/0.3 mL SOAJ, Inject 0.3 mL (0.3 mg total) into a muscle once for 1 dose, Disp: 0.6 mL, Rfl: 0    sildenafil (VIAGRA) 100 mg tablet, TAKE ONE TABLET BY MOUTH ONCE DAILY AS NEEDED FOR ERECTILE DYSFUNCTION, Disp: 10 tablet, Rfl: 3    Recent Results (from the past 672 hour(s))   CBC and differential    Collection Time: 04/03/24  8:45 AM   Result Value Ref Range    White Blood Cell Count 5.0 3.4 - 10.8 x10E3/uL    Red Blood Cell Count 4.60 4.14 - 5.80 x10E6/uL    Hemoglobin 14.7 13.0 - 17.7 g/dL    HCT 44.5 37.5 - 51.0 %    MCV 97 79 - 97 fL    MCH 32.0 26.6 - 33.0 pg    MCHC 33.0 31.5 - 35.7 g/dL    RDW 11.9 11.6 - 15.4 %    Platelet Count 218 150 - 450 x10E3/uL    Neutrophils 53 Not Estab. %    Lymphocytes 34 Not Estab. %    Monocytes 9 Not Estab. %    Eosinophils 3 Not Estab. %    Basophils PCT 1 Not Estab. %    Neutrophils (Absolute) 2.6 1.4 - 7.0 x10E3/uL    Lymphocytes (Absolute) 1.7 0.7 - 3.1 x10E3/uL    Monocytes (Absolute) 0.5 0.1 - 0.9 x10E3/uL    Eosinophils (Absolute) 0.2 0.0 - 0.4 x10E3/uL    Basophils ABS 0.0 0.0 - 0.2 x10E3/uL    Immature Granulocytes 0 Not Estab. %    Immature Granulocytes (Absolute) 0.0 0.0 - 0.1 x10E3/uL   Comprehensive metabolic panel    Collection Time: 04/03/24  8:45 AM   Result Value Ref Range    Glucose, Random 101 (H) 70 - 99 mg/dL    BUN 13 6 - 24 mg/dL    Creatinine 0.84 0.76 - 1.27 mg/dL    eGFR 100 >59 mL/min/1.73    SL AMB BUN/CREATININE RATIO 15 9 - 20    Sodium 141 134 - 144 mmol/L    Potassium 4.7 3.5 - 5.2 mmol/L    Chloride 103 96 - 106 mmol/L    CO2 26 20 - 29 mmol/L    CALCIUM 9.1 8.7 - 10.2 mg/dL    Protein, Total 6.4 6.0 - 8.5 g/dL    Albumin 4.1 3.8 - 4.9 g/dL     Globulin, Total 2.3 1.5 - 4.5 g/dL    Albumin/Globulin Ratio 1.8 1.2 - 2.2    TOTAL BILIRUBIN 0.5 0.0 - 1.2 mg/dL    Alk Phos Isoenzymes 92 44 - 121 IU/L    AST 17 0 - 40 IU/L    ALT 20 0 - 44 IU/L   Lipid panel    Collection Time: 04/03/24  8:45 AM   Result Value Ref Range    Cholesterol, Total 128 100 - 199 mg/dL    Triglycerides 57 0 - 149 mg/dL    HDL 52 >39 mg/dL    VLDL Cholesterol Calculated 12 5 - 40 mg/dL    LDL Calculated 64 0 - 99 mg/dL    T. Chol/HDL Ratio 2.5 0.0 - 5.0 ratio   PSA Total (Reflex To Free)    Collection Time: 04/03/24  8:45 AM   Result Value Ref Range    Prostate Specific Antigen Total 0.5 0.0 - 4.0 ng/mL    Reflex Criteria Comment    TSH, 3rd generation    Collection Time: 04/03/24  8:45 AM   Result Value Ref Range    TSH 1.110 0.450 - 4.500 uIU/mL

## 2024-04-15 NOTE — PROGRESS NOTES
Depression Screening and Follow-up Plan: Patient was screened for depression during today's encounter. They screened negative with a PHQ-2 score of 0.    FAMILY PRACTICE HEALTH MAINTENANCE OFFICE VISIT  [facility] - [facility]    NAME: Carlos A Gloria  AGE: 58 y.o. SEX: male  : 1965     DATE: 4/15/2024    Assessment and Plan     Problem List Items Addressed This Visit        Cardiovascular and Mediastinum    Elevated coronary artery calcium score    Dilated aortic root (HCC)       Other    Mixed hyperlipidemia    Elevated BP without diagnosis of hypertension    Relevant Orders    POCT ECG (Completed)   Other Visit Diagnoses     Routine general medical examination at a health care facility    -  Primary    Screening for hypothyroidism        Encounter for prostate cancer screening        Colon cancer screening        Relevant Orders    POCT hemoccult screening (Completed)               Return in about 6 months (around 10/15/2024) for Recheck.        Chief Complaint     Chief Complaint   Patient presents with   • Annual Exam       History of Present Illness     DISCUSSED HEALTH ISSUES  REVIEWED MEDICAL RECORD  NO CONCERNS AT THIS TIME            Well Adult Physical   Patient here for a comprehensive physical exam.      Diet and Physical Activity  Diet: well balanced diet  Weight concerns: Patient has class 1 obesity (BMI 30-34.9)  Exercise: occasionally      Depression Screen  PHQ-2/9 Depression Screening    Little interest or pleasure in doing things: 0 - not at all  Feeling down, depressed, or hopeless: 0 - not at all  PHQ-2 Score: 0  PHQ-2 Interpretation: Negative depression screen          General Health  Hearing: Normal:  bilateral  Vision: no vision problems  Dental: regular dental visits    Reproductive Health          The following portions of the patient's history were reviewed and updated as appropriate: allergies, current medications, past family history,  past medical history, past social history, past surgical history and problem list.    Review of Systems     Review of Systems   Constitutional:  Negative for chills, fatigue and fever.   HENT:  Negative for congestion, ear discharge, ear pain, mouth sores, postnasal drip, sore throat and trouble swallowing.    Eyes:  Negative for pain, discharge and visual disturbance.   Respiratory:  Negative for cough, shortness of breath and wheezing.    Cardiovascular:  Negative for chest pain, palpitations and leg swelling.   Gastrointestinal:  Negative for abdominal distention, abdominal pain, blood in stool, diarrhea and nausea.   Endocrine: Negative for polydipsia, polyphagia and polyuria.   Genitourinary:  Negative for dysuria, frequency, hematuria and urgency.   Musculoskeletal:  Negative for arthralgias, gait problem and joint swelling.   Skin:  Negative for pallor and rash.   Neurological:  Negative for dizziness, syncope, speech difficulty, weakness, light-headedness, numbness and headaches.   Hematological:  Negative for adenopathy.   Psychiatric/Behavioral:  Negative for behavioral problems, confusion and sleep disturbance. The patient is not nervous/anxious.        Past Medical History     Past Medical History:   Diagnosis Date   • ED (erectile dysfunction)    • Hyperlipidemia        Past Surgical History     Past Surgical History:   Procedure Laterality Date   • ADENOIDECTOMY     • APPENDECTOMY     • COLONOSCOPY     • GALLBLADDER SURGERY         Social History     Social History     Socioeconomic History   • Marital status: /Civil Union     Spouse name: None   • Number of children: None   • Years of education: None   • Highest education level: None   Occupational History   • None   Tobacco Use   • Smoking status: Never     Passive exposure: Never   • Smokeless tobacco: Never   Vaping Use   • Vaping status: Never Used   Substance and Sexual Activity   • Alcohol use: Yes     Comment: Minimum alcohol consumption  "  • Drug use: No   • Sexual activity: None   Other Topics Concern   • None   Social History Narrative    Caffeine use    Drinks coffee    Patient ingests cola containing caffeine     Social Determinants of Health     Financial Resource Strain: Not on file   Food Insecurity: Not on file   Transportation Needs: Not on file   Physical Activity: Not on file   Stress: Not on file   Social Connections: Not on file   Intimate Partner Violence: Not on file   Housing Stability: Not on file       Family History     Family History   Problem Relation Age of Onset   • Coronary artery disease Father 71        CAD involving CABG of native heart with other forms of angina pectoris   • Hypertension Father         Essential Hypertension   • Heart attack Father         Myocardial Infarction involving other coronary artery   • Heart attack Cousin         Myocardial Infarction involving other coronary artery   • Mental illness Neg Hx    • Substance Abuse Neg Hx        Current Medications       Current Outpatient Medications:   •  aspirin (ECOTRIN LOW STRENGTH) 81 mg EC tablet, Take 1 tablet (81 mg total) by mouth daily, Disp: 90 tablet, Rfl: 1  •  atorvastatin (LIPITOR) 20 mg tablet, TAKE ONE TABLET BY MOUTH EVERY DAY, Disp: 90 tablet, Rfl: 0  •  EPINEPHrine (EPIPEN) 0.3 mg/0.3 mL SOAJ, Inject 0.3 mL (0.3 mg total) into a muscle once for 1 dose, Disp: 0.6 mL, Rfl: 0  •  sildenafil (VIAGRA) 100 mg tablet, TAKE ONE TABLET BY MOUTH ONCE DAILY AS NEEDED FOR ERECTILE DYSFUNCTION, Disp: 10 tablet, Rfl: 3     Allergies     No Known Allergies    Objective     /94 (BP Location: Left arm, Patient Position: Sitting, Cuff Size: Large)   Pulse 66   Temp (!) 97.2 °F (36.2 °C) (Temporal)   Resp 14   Ht 5' 10\" (1.778 m)   Wt 97.1 kg (214 lb)   SpO2 98%   BMI 30.71 kg/m²      Physical Exam  Constitutional:       General: He is not in acute distress.     Appearance: Normal appearance. He is well-developed. He is not ill-appearing.   HENT:    "   Head: Normocephalic and atraumatic.      Right Ear: Tympanic membrane and external ear normal.      Left Ear: Tympanic membrane and external ear normal.      Nose: Nose normal.      Mouth/Throat:      Mouth: Mucous membranes are moist.   Eyes:      General:         Right eye: No discharge.         Left eye: No discharge.      Conjunctiva/sclera: Conjunctivae normal.      Pupils: Pupils are equal, round, and reactive to light.   Neck:      Thyroid: No thyromegaly.      Vascular: No JVD.   Cardiovascular:      Rate and Rhythm: Normal rate and regular rhythm.      Heart sounds: Normal heart sounds. No murmur heard.  Pulmonary:      Effort: Pulmonary effort is normal.      Breath sounds: Normal breath sounds. No wheezing or rales.   Abdominal:      General: Bowel sounds are normal.      Palpations: Abdomen is soft. There is no mass.      Tenderness: There is no abdominal tenderness. There is no guarding or rebound.   Genitourinary:     Penis: Normal.       Testes: Normal.      Prostate: Normal.      Rectum: Normal. Guaiac result negative.   Musculoskeletal:         General: No tenderness or deformity. Normal range of motion.      Cervical back: Neck supple.   Lymphadenopathy:      Cervical: No cervical adenopathy.   Skin:     General: Skin is warm and dry.      Findings: No erythema or rash.   Neurological:      General: No focal deficit present.      Mental Status: He is alert and oriented to person, place, and time.      Cranial Nerves: No cranial nerve deficit.      Sensory: No sensory deficit.      Motor: No weakness or abnormal muscle tone.      Coordination: Coordination normal.      Gait: Gait normal.      Deep Tendon Reflexes: Reflexes are normal and symmetric. Reflexes normal.   Psychiatric:         Mood and Affect: Mood normal.         Behavior: Behavior normal.         Thought Content: Thought content normal.         Judgment: Judgment normal.           No results found.    [facility]  Maintenance   Topic Date Due   • Zoster Vaccine (1 of 2) Never done   • Influenza Vaccine (1) Never done   • COVID-19 Vaccine (4 - 2023-24 season) 09/01/2023   • Depression Screening  04/15/2025   • Annual Physical  04/15/2025   • DTaP,Tdap,and Td Vaccines (2 - Td or Tdap) 07/02/2028   • Colorectal Cancer Screening  04/14/2033   • HIV Screening  Completed   • Hepatitis C Screening  Completed   • Pneumococcal Vaccine: Pediatrics (0 to 5 Years) and At-Risk Patients (6 to 64 Years)  Aged Out   • HIB Vaccine  Aged Out   • IPV Vaccine  Aged Out   • Hepatitis A Vaccine  Aged Out   • Meningococcal ACWY Vaccine  Aged Out   • HPV Vaccine  Aged Out     Immunization History   Administered Date(s) Administered   • COVID-19 PFIZER VACCINE 0.3 ML IM 04/03/2021, 04/24/2021, 12/18/2021   • Tdap 07/02/2018       Js Lugo MD  [facility]

## 2024-05-01 ENCOUNTER — OFFICE VISIT (OUTPATIENT)
Dept: FAMILY MEDICINE CLINIC | Facility: CLINIC | Age: 59
End: 2024-05-01
Payer: COMMERCIAL

## 2024-05-01 VITALS
BODY MASS INDEX: 30.92 KG/M2 | HEART RATE: 50 BPM | WEIGHT: 216 LBS | SYSTOLIC BLOOD PRESSURE: 128 MMHG | DIASTOLIC BLOOD PRESSURE: 88 MMHG | HEIGHT: 70 IN | TEMPERATURE: 97.3 F | OXYGEN SATURATION: 99 % | RESPIRATION RATE: 14 BRPM

## 2024-05-01 DIAGNOSIS — R04.0 EPISTAXIS: Primary | ICD-10-CM

## 2024-05-01 PROCEDURE — 99213 OFFICE O/P EST LOW 20 MIN: CPT | Performed by: FAMILY MEDICINE

## 2024-05-01 NOTE — PROGRESS NOTES
"Name: Carlos A Gloria      : 1965      MRN: [id number]  Encounter Provider: Js Lugo MD  Encounter Date: 2024   Encounter department: [facility]    Assessment & Plan     1. Epistaxis           Subjective      Nose Bleed   The bleeding has been from the left nare. This is a new problem. The current episode started in the past 7 days. The problem occurs daily. The problem has been unchanged. The bleeding is associated with aspirin and dry air. He has tried pressure for the symptoms. The treatment provided moderate relief. There is no history of allergies, a bleeding disorder, colds, frequent nosebleeds or sinus problems.     Review of Systems   Constitutional:  Negative for chills, fatigue and fever.   HENT:  Positive for nosebleeds. Negative for sore throat.    Eyes:  Negative for discharge.   Respiratory:  Negative for cough and chest tightness.    Cardiovascular:  Negative for chest pain and palpitations.   Gastrointestinal:  Negative for abdominal pain, diarrhea, nausea and vomiting.   Musculoskeletal:  Negative for arthralgias and gait problem.   Neurological:  Negative for dizziness, weakness and headaches.   Hematological:  Negative for adenopathy.   Psychiatric/Behavioral:  The patient is not nervous/anxious.        Current Outpatient Medications on File Prior to Visit   Medication Sig   • aspirin (ECOTRIN LOW STRENGTH) 81 mg EC tablet Take 1 tablet (81 mg total) by mouth daily   • atorvastatin (LIPITOR) 20 mg tablet TAKE ONE TABLET BY MOUTH EVERY DAY   • EPINEPHrine (EPIPEN) 0.3 mg/0.3 mL SOAJ Inject 0.3 mL (0.3 mg total) into a muscle once for 1 dose   • sildenafil (VIAGRA) 100 mg tablet TAKE ONE TABLET BY MOUTH ONCE DAILY AS NEEDED FOR ERECTILE DYSFUNCTION       Objective     /88 (BP Location: Left arm, Patient Position: Sitting, Cuff Size: Large)   Pulse (!) 50   Temp (!) 97.3 °F (36.3 °C) (Temporal)   Resp 14   Ht 5' 10\" (1.778 m)   Wt 98 kg " (216 lb)   SpO2 99%   BMI 30.99 kg/m²     Physical Exam  HENT:      Head: Normocephalic and atraumatic.      Right Ear: Tympanic membrane normal.      Left Ear: Tympanic membrane normal.      Nose:      Comments: L MUCOSA  BLEEDING SITE IDENTIFIED ANTERIORLY  SMALL SCAB  NO ACTIVE BLEEDING     Mouth/Throat:      Mouth: Mucous membranes are moist.      Pharynx: No posterior oropharyngeal erythema.       Js Lugo MD

## 2024-05-01 NOTE — PATIENT INSTRUCTIONS
INCREASE HUMIDITY  NASAL SALINE SPRAY  AFRIN AS A RESCUE  HOLD ASA FOR 1-2 WEEKS    IF SYMPTOMS PERSIST - CONSIDER ENT CONSULT

## 2024-06-21 DIAGNOSIS — N52.9 ERECTILE DYSFUNCTION, UNSPECIFIED ERECTILE DYSFUNCTION TYPE: ICD-10-CM

## 2024-06-21 RX ORDER — SILDENAFIL 100 MG/1
100 TABLET, FILM COATED ORAL AS NEEDED
Qty: 10 TABLET | Refills: 3 | Status: SHIPPED | OUTPATIENT
Start: 2024-06-21

## 2024-07-01 DIAGNOSIS — E78.2 MIXED HYPERLIPIDEMIA: ICD-10-CM

## 2024-07-01 RX ORDER — ATORVASTATIN CALCIUM 20 MG/1
20 TABLET, FILM COATED ORAL DAILY
Qty: 90 TABLET | Refills: 1 | Status: SHIPPED | OUTPATIENT
Start: 2024-07-01

## 2024-09-29 DIAGNOSIS — E78.2 MIXED HYPERLIPIDEMIA: ICD-10-CM

## 2024-09-29 DIAGNOSIS — N52.9 ERECTILE DYSFUNCTION, UNSPECIFIED ERECTILE DYSFUNCTION TYPE: ICD-10-CM

## 2024-09-30 ENCOUNTER — TELEPHONE (OUTPATIENT)
Age: 59
End: 2024-09-30

## 2024-09-30 RX ORDER — SILDENAFIL 100 MG/1
100 TABLET, FILM COATED ORAL AS NEEDED
Qty: 10 TABLET | Refills: 0 | Status: SHIPPED | OUTPATIENT
Start: 2024-09-30

## 2024-09-30 RX ORDER — ATORVASTATIN CALCIUM 20 MG/1
20 TABLET, FILM COATED ORAL DAILY
Qty: 90 TABLET | Refills: 1 | Status: SHIPPED | OUTPATIENT
Start: 2024-09-30

## 2024-09-30 NOTE — TELEPHONE ENCOUNTER
PA for Sildenafil (VIAGRA) 100 mg tablet SUBMITTED     via    []CMM-KEY:   [x]Gregg-Case ID # [id number]  []Faxed to plan   []Other website   []Phone call Case ID #     Office notes sent, clinical questions answered. Awaiting determination    Turnaround time for your insurance to make a decision on your Prior Authorization can take 7-21 business days.

## 2024-10-01 NOTE — TELEPHONE ENCOUNTER
PA for Sildenafil (VIAGRA) 100 mg tablet DENIED    Reason:(Screenshot if applicable)        Message sent to office clinical pool Yes    Denial letter scanned into Media Yes    Appeal started No     **Please follow up with your patient regarding denial and next steps**

## 2024-12-14 DIAGNOSIS — N52.9 ERECTILE DYSFUNCTION, UNSPECIFIED ERECTILE DYSFUNCTION TYPE: ICD-10-CM

## 2024-12-15 RX ORDER — SILDENAFIL 100 MG/1
100 TABLET, FILM COATED ORAL AS NEEDED
Qty: 10 TABLET | Refills: 5 | Status: SHIPPED | OUTPATIENT
Start: 2024-12-15

## 2024-12-30 DIAGNOSIS — N52.9 ERECTILE DYSFUNCTION, UNSPECIFIED ERECTILE DYSFUNCTION TYPE: ICD-10-CM

## 2024-12-30 DIAGNOSIS — E78.2 MIXED HYPERLIPIDEMIA: ICD-10-CM

## 2024-12-31 RX ORDER — SILDENAFIL 100 MG/1
100 TABLET, FILM COATED ORAL AS NEEDED
Qty: 10 TABLET | Refills: 5 | Status: SHIPPED | OUTPATIENT
Start: 2024-12-31

## 2024-12-31 RX ORDER — ATORVASTATIN CALCIUM 20 MG/1
20 TABLET, FILM COATED ORAL DAILY
Qty: 90 TABLET | Refills: 1 | Status: SHIPPED | OUTPATIENT
Start: 2024-12-31

## 2025-03-05 DIAGNOSIS — Z12.5 ENCOUNTER FOR PROSTATE CANCER SCREENING: ICD-10-CM

## 2025-03-05 DIAGNOSIS — Z00.00 ROUTINE GENERAL MEDICAL EXAMINATION AT A HEALTH CARE FACILITY: Primary | ICD-10-CM

## 2025-03-05 DIAGNOSIS — E78.2 MIXED HYPERLIPIDEMIA: ICD-10-CM

## 2025-03-05 DIAGNOSIS — Z13.29 SCREENING FOR HYPOTHYROIDISM: ICD-10-CM

## 2025-03-05 DIAGNOSIS — R03.0 ELEVATED BP WITHOUT DIAGNOSIS OF HYPERTENSION: ICD-10-CM

## 2025-03-21 DIAGNOSIS — E78.2 MIXED HYPERLIPIDEMIA: ICD-10-CM

## 2025-03-21 RX ORDER — ATORVASTATIN CALCIUM 20 MG/1
20 TABLET, FILM COATED ORAL DAILY
Qty: 90 TABLET | Refills: 1 | Status: SHIPPED | OUTPATIENT
Start: 2025-03-21

## 2025-04-07 LAB
BASOPHILS # BLD AUTO: 0 X10E3/UL (ref 0–0.2)
BASOPHILS NFR BLD AUTO: 1 %
EOSINOPHIL # BLD AUTO: 0.1 X10E3/UL (ref 0–0.4)
EOSINOPHIL NFR BLD AUTO: 2 %
ERYTHROCYTE [DISTWIDTH] IN BLOOD BY AUTOMATED COUNT: 12 % (ref 11.6–15.4)
HCT VFR BLD AUTO: 43.6 % (ref 37.5–51)
HGB BLD-MCNC: 14.7 G/DL (ref 13–17.7)
IMM GRANULOCYTES # BLD: 0 X10E3/UL (ref 0–0.1)
IMM GRANULOCYTES NFR BLD: 0 %
LYMPHOCYTES # BLD AUTO: 1.7 X10E3/UL (ref 0.7–3.1)
LYMPHOCYTES NFR BLD AUTO: 32 %
MCH RBC QN AUTO: 32.3 PG (ref 26.6–33)
MCHC RBC AUTO-ENTMCNC: 33.7 G/DL (ref 31.5–35.7)
MCV RBC AUTO: 96 FL (ref 79–97)
MONOCYTES # BLD AUTO: 0.5 X10E3/UL (ref 0.1–0.9)
MONOCYTES NFR BLD AUTO: 10 %
NEUTROPHILS # BLD AUTO: 2.9 X10E3/UL (ref 1.4–7)
NEUTROPHILS NFR BLD AUTO: 55 %
PLATELET # BLD AUTO: 217 X10E3/UL (ref 150–450)
RBC # BLD AUTO: 4.55 X10E6/UL (ref 4.14–5.8)
WBC # BLD AUTO: 5.3 X10E3/UL (ref 3.4–10.8)

## 2025-04-08 LAB
ALBUMIN SERPL-MCNC: 4.3 G/DL (ref 3.8–4.9)
ALP SERPL-CCNC: 94 IU/L (ref 44–121)
ALT SERPL-CCNC: 23 IU/L (ref 0–44)
AST SERPL-CCNC: 16 IU/L (ref 0–40)
BILIRUB SERPL-MCNC: 0.8 MG/DL (ref 0–1.2)
BUN SERPL-MCNC: 16 MG/DL (ref 8–27)
BUN/CREAT SERPL: 18 (ref 10–24)
CALCIUM SERPL-MCNC: 9.4 MG/DL (ref 8.6–10.2)
CHLORIDE SERPL-SCNC: 104 MMOL/L (ref 96–106)
CHOLEST SERPL-MCNC: 135 MG/DL (ref 100–199)
CHOLEST/HDLC SERPL: 2.7 RATIO (ref 0–5)
CO2 SERPL-SCNC: 22 MMOL/L (ref 20–29)
CREAT SERPL-MCNC: 0.88 MG/DL (ref 0.76–1.27)
EGFR: 98 ML/MIN/1.73
GLOBULIN SER-MCNC: 2.6 G/DL (ref 1.5–4.5)
GLUCOSE SERPL-MCNC: 93 MG/DL (ref 70–99)
HDLC SERPL-MCNC: 50 MG/DL
LDLC SERPL CALC-MCNC: 71 MG/DL (ref 0–99)
POTASSIUM SERPL-SCNC: 4.6 MMOL/L (ref 3.5–5.2)
PROT SERPL-MCNC: 6.9 G/DL (ref 6–8.5)
PSA SERPL-MCNC: 0.6 NG/ML (ref 0–4)
SL AMB REFLEX CRITERIA: NORMAL
SL AMB VLDL CHOLESTEROL CALC: 14 MG/DL (ref 5–40)
SODIUM SERPL-SCNC: 141 MMOL/L (ref 134–144)
TRIGL SERPL-MCNC: 69 MG/DL (ref 0–149)
TSH SERPL DL<=0.005 MIU/L-ACNC: 1.39 UIU/ML (ref 0.45–4.5)

## 2025-04-15 NOTE — PATIENT INSTRUCTIONS
DISCUSSED HEALTH MAINTENENCE ISSUES  BW WILL BE REVIEWED  ENCOURAGED HEALTHY DIET AND EXERCISE  COLONOSCOPY WILL BE ORDERED  RV FOR ANNUAL HEALTH EXAM IN 1 YEAR  RV SOONER IF THERE ARE ANY CONCERNS      RV 6M    Recent Results (from the past 4 weeks)   CBC and differential    Collection Time: 04/07/25  9:21 AM   Result Value Ref Range    White Blood Cell Count 5.3 3.4 - 10.8 x10E3/uL    Red Blood Cell Count 4.55 4.14 - 5.80 x10E6/uL    Hemoglobin 14.7 13.0 - 17.7 g/dL    HCT 43.6 37.5 - 51.0 %    MCV 96 79 - 97 fL    MCH 32.3 26.6 - 33.0 pg    MCHC 33.7 31.5 - 35.7 g/dL    RDW 12.0 11.6 - 15.4 %    Platelet Count 217 150 - 450 x10E3/uL    Neutrophils 55 Not Estab. %    Lymphocytes 32 Not Estab. %    Monocytes 10 Not Estab. %    Eosinophils 2 Not Estab. %    Basophils PCT 1 Not Estab. %    Neutrophils (Absolute) 2.9 1.4 - 7.0 x10E3/uL    Lymphocytes (Absolute) 1.7 0.7 - 3.1 x10E3/uL    Monocytes (Absolute) 0.5 0.1 - 0.9 x10E3/uL    Eosinophils (Absolute) 0.1 0.0 - 0.4 x10E3/uL    Basophils ABS 0.0 0.0 - 0.2 x10E3/uL    Immature Granulocytes 0 Not Estab. %    Immature Granulocytes (Absolute) 0.0 0.0 - 0.1 x10E3/uL   Comprehensive metabolic panel    Collection Time: 04/07/25  9:21 AM   Result Value Ref Range    Glucose, Random 93 70 - 99 mg/dL    BUN 16 8 - 27 mg/dL    Creatinine 0.88 0.76 - 1.27 mg/dL    eGFR 98 >59 mL/min/1.73    SL AMB BUN/CREATININE RATIO 18 10 - 24    Sodium 141 134 - 144 mmol/L    Potassium 4.6 3.5 - 5.2 mmol/L    Chloride 104 96 - 106 mmol/L    CO2 22 20 - 29 mmol/L    CALCIUM 9.4 8.6 - 10.2 mg/dL    Protein, Total 6.9 6.0 - 8.5 g/dL    Albumin 4.3 3.8 - 4.9 g/dL    Globulin, Total 2.6 1.5 - 4.5 g/dL    TOTAL BILIRUBIN 0.8 0.0 - 1.2 mg/dL    Alk Phos Isoenzymes 94 44 - 121 IU/L    AST 16 0 - 40 IU/L    ALT 23 0 - 44 IU/L   Lipid panel    Collection Time: 04/07/25  9:21 AM   Result Value Ref Range    Cholesterol, Total 135 100 - 199 mg/dL    Triglycerides 69 0 - 149 mg/dL    HDL 50 >39 mg/dL     "VLDL Cholesterol Calculated 14 5 - 40 mg/dL    LDL Calculated 71 0 - 99 mg/dL    T. Chol/HDL Ratio 2.7 0.0 - 5.0 ratio   PSA Total (Reflex To Free)    Collection Time: 04/07/25  9:21 AM   Result Value Ref Range    Prostate Specific Antigen Total 0.6 0.0 - 4.0 ng/mL    Reflex Criteria Comment    TSH, 3rd generation    Collection Time: 04/07/25  9:21 AM   Result Value Ref Range    TSH 1.390 0.450 - 4.500 uIU/mL       Patient Education     Routine physical for adults   The Basics   Written by the doctors and editors at [facility]   What is a physical? -- A physical is a routine visit, or \"check-up,\" with your doctor. You might also hear it called a \"wellness visit\" or \"preventive visit.\"  During each visit, the doctor will:   Ask about your physical and mental health   Ask about your habits, behaviors, and lifestyle   Do an exam   Give you vaccines if needed   Talk to you about any medicines you take   Give advice about your health   Answer your questions  Getting regular check-ups is an important part of taking care of your health. It can help your doctor find and treat any problems you have. But it's also important for preventing health problems.  A routine physical is different from a \"sick visit.\" A sick visit is when you see a doctor because of a health concern or problem. Since physicals are scheduled ahead of time, you can think about what you want to ask the doctor.  How often should I get a physical? -- It depends on your age and health. In general, for people age 21 years and older:   If you are younger than 50 years, you might be able to get a physical every 3 years.   If you are 50 years or older, your doctor might recommend a physical every year.  If you have an ongoing health condition, like diabetes or high blood pressure, your doctor will probably want to see you more often.  What happens during a physical? -- In general, each visit will include:   Physical exam - The doctor or nurse will check your " "height, weight, heart rate, and blood pressure. They will also look at your eyes and ears. They will ask about how you are feeling and whether you have any symptoms that bother you.   Medicines - It's a good idea to bring a list of all the medicines you take to each doctor visit. Your doctor will talk to you about your medicines and answer any questions. Tell them if you are having any side effects that bother you. You should also tell them if you are having trouble paying for any of your medicines.   Habits and behaviors - This includes:   Your diet   Your exercise habits   Whether you smoke, drink alcohol, or use drugs   Whether you are sexually active   Whether you feel safe at home  Your doctor will talk to you about things you can do to improve your health and lower your risk of health problems. They will also offer help and support. For example, if you want to quit smoking, they can give you advice and might prescribe medicines. If you want to improve your diet or get more physical activity, they can help you with this, too.   Lab tests, if needed - The tests you get will depend on your age and situation. For example, your doctor might want to check your:   Cholesterol   Blood sugar   Iron level   Vaccines - The recommended vaccines will depend on your age, health, and what vaccines you already had. Vaccines are very important because they can prevent certain serious or deadly infections.   Discussion of screening - \"Screening\" means checking for diseases or other health problems before they cause symptoms. Your doctor can recommend screening based on your age, risk, and preferences. This might include tests to check for:   Cancer, such as breast, prostate, cervical, ovarian, colorectal, prostate, lung, or skin cancer   Sexually transmitted infections, such as chlamydia and gonorrhea   Mental health conditions like depression and anxiety  Your doctor will talk to you about the different types of screening " tests. They can help you decide which screenings to have. They can also explain what the results might mean.   Answering questions - The physical is a good time to ask the doctor or nurse questions about your health. If needed, they can refer you to other doctors or specialists, too.  Adults older than 65 years often need other care, too. As you get older, your doctor will talk to you about:   How to prevent falling at home   Hearing or vision tests   Memory testing   How to take your medicines safely   Making sure that you have the help and support you need at home  All topics are updated as new evidence becomes available and our peer review process is complete.  This topic retrieved from [employer] on: May 02, 2024.  Topic 291311 Version 1.0  Release: 32.4.3 - C32.122  © 2024 UpToDate, Inc. and/or its affiliates. All rights reserved.  Consumer Information Use and Disclaimer   Disclaimer: This generalized information is a limited summary of diagnosis, treatment, and/or medication information. It is not meant to be comprehensive and should be used as a tool to help the user understand and/or assess potential diagnostic and treatment options. It does NOT include all information about conditions, treatments, medications, side effects, or risks that may apply to a specific patient. It is not intended to be medical advice or a substitute for the medical advice, diagnosis, or treatment of a health care provider based on the health care provider's examination and assessment of a patient's specific and unique circumstances. Patients must speak with a health care provider for complete information about their health, medical questions, and treatment options, including any risks or benefits regarding use of medications. This information does not endorse any treatments or medications as safe, effective, or approved for treating a specific patient. UpToDate, Inc. and its affiliates disclaim any warranty or liability relating to  this information or the use thereof.The use of this information is governed by the Terms of Use, available at https://www.wolterskluwer.com/en/know/clinical-effectiveness-terms. 2024© UpToDate, Inc. and its affiliates and/or licensors. All rights reserved.  Copyright   © 2024 [employer] and/or its affiliates. All rights reserved.

## 2025-04-16 ENCOUNTER — OFFICE VISIT (OUTPATIENT)
Dept: FAMILY MEDICINE CLINIC | Facility: CLINIC | Age: 60
End: 2025-04-16
Payer: COMMERCIAL

## 2025-04-16 VITALS
SYSTOLIC BLOOD PRESSURE: 122 MMHG | HEART RATE: 54 BPM | BODY MASS INDEX: 30.49 KG/M2 | RESPIRATION RATE: 18 BRPM | TEMPERATURE: 96.9 F | OXYGEN SATURATION: 95 % | HEIGHT: 70 IN | WEIGHT: 213 LBS | DIASTOLIC BLOOD PRESSURE: 88 MMHG

## 2025-04-16 DIAGNOSIS — Z12.11 COLON CANCER SCREENING: ICD-10-CM

## 2025-04-16 DIAGNOSIS — I77.810 DILATED AORTIC ROOT (HCC): ICD-10-CM

## 2025-04-16 DIAGNOSIS — Z00.00 ANNUAL PHYSICAL EXAM: ICD-10-CM

## 2025-04-16 DIAGNOSIS — Z00.00 ROUTINE GENERAL MEDICAL EXAMINATION AT A HEALTH CARE FACILITY: Primary | ICD-10-CM

## 2025-04-16 DIAGNOSIS — E78.2 MIXED HYPERLIPIDEMIA: ICD-10-CM

## 2025-04-16 DIAGNOSIS — Z13.29 SCREENING FOR HYPOTHYROIDISM: ICD-10-CM

## 2025-04-16 DIAGNOSIS — K40.90 LEFT INGUINAL HERNIA: ICD-10-CM

## 2025-04-16 DIAGNOSIS — Z12.5 ENCOUNTER FOR PROSTATE CANCER SCREENING: ICD-10-CM

## 2025-04-16 DIAGNOSIS — R03.0 ELEVATED BP WITHOUT DIAGNOSIS OF HYPERTENSION: ICD-10-CM

## 2025-04-16 LAB — SL AMB POCT FECES OCC BLD: NORMAL

## 2025-04-16 PROCEDURE — 99396 PREV VISIT EST AGE 40-64: CPT | Performed by: FAMILY MEDICINE

## 2025-04-16 PROCEDURE — 82270 OCCULT BLOOD FECES: CPT | Performed by: FAMILY MEDICINE

## 2025-04-16 NOTE — LETTER
LAINEY CAPELLAN      Current Outpatient Medications:     aspirin (ECOTRIN LOW STRENGTH) 81 mg EC tablet, Take 1 tablet (81 mg total) by mouth daily, Disp: 90 tablet, Rfl: 1    atorvastatin (LIPITOR) 20 mg tablet, Take 1 tablet (20 mg total) by mouth daily, Disp: 90 tablet, Rfl: 1    EPINEPHrine (EPIPEN) 0.3 mg/0.3 mL SOAJ, Inject 0.3 mL (0.3 mg total) into a muscle once for 1 dose, Disp: 0.6 mL, Rfl: 0    sildenafil (VIAGRA) 100 mg tablet, Take 1 tablet (100 mg total) by mouth as needed for erectile dysfunction, Disp: 10 tablet, Rfl: 5      Recent Results (from the past 4 weeks)   CBC and differential    Collection Time: 04/07/25  9:21 AM   Result Value Ref Range    White Blood Cell Count 5.3 3.4 - 10.8 x10E3/uL    Red Blood Cell Count 4.55 4.14 - 5.80 x10E6/uL    Hemoglobin 14.7 13.0 - 17.7 g/dL    HCT 43.6 37.5 - 51.0 %    MCV 96 79 - 97 fL    MCH 32.3 26.6 - 33.0 pg    MCHC 33.7 31.5 - 35.7 g/dL    RDW 12.0 11.6 - 15.4 %    Platelet Count 217 150 - 450 x10E3/uL    Neutrophils 55 Not Estab. %    Lymphocytes 32 Not Estab. %    Monocytes 10 Not Estab. %    Eosinophils 2 Not Estab. %    Basophils PCT 1 Not Estab. %    Neutrophils (Absolute) 2.9 1.4 - 7.0 x10E3/uL    Lymphocytes (Absolute) 1.7 0.7 - 3.1 x10E3/uL    Monocytes (Absolute) 0.5 0.1 - 0.9 x10E3/uL    Eosinophils (Absolute) 0.1 0.0 - 0.4 x10E3/uL    Basophils ABS 0.0 0.0 - 0.2 x10E3/uL    Immature Granulocytes 0 Not Estab. %    Immature Granulocytes (Absolute) 0.0 0.0 - 0.1 x10E3/uL   Comprehensive metabolic panel    Collection Time: 04/07/25  9:21 AM   Result Value Ref Range    Glucose, Random 93 70 - 99 mg/dL    BUN 16 8 - 27 mg/dL    Creatinine 0.88 0.76 - 1.27 mg/dL    eGFR 98 >59 mL/min/1.73    SL AMB BUN/CREATININE RATIO 18 10 - 24    Sodium 141 134 - 144 mmol/L    Potassium 4.6 3.5 - 5.2 mmol/L    Chloride 104 96 - 106 mmol/L    CO2 22 20 - 29 mmol/L    CALCIUM 9.4 8.6 - 10.2 mg/dL    Protein, Total 6.9 6.0 - 8.5 g/dL    Albumin 4.3 3.8 - 4.9 g/dL     Globulin, Total 2.6 1.5 - 4.5 g/dL    TOTAL BILIRUBIN 0.8 0.0 - 1.2 mg/dL    Alk Phos Isoenzymes 94 44 - 121 IU/L    AST 16 0 - 40 IU/L    ALT 23 0 - 44 IU/L   Lipid panel    Collection Time: 04/07/25  9:21 AM   Result Value Ref Range    Cholesterol, Total 135 100 - 199 mg/dL    Triglycerides 69 0 - 149 mg/dL    HDL 50 >39 mg/dL    VLDL Cholesterol Calculated 14 5 - 40 mg/dL    LDL Calculated 71 0 - 99 mg/dL    T. Chol/HDL Ratio 2.7 0.0 - 5.0 ratio   PSA Total (Reflex To Free)    Collection Time: 04/07/25  9:21 AM   Result Value Ref Range    Prostate Specific Antigen Total 0.6 0.0 - 4.0 ng/mL    Reflex Criteria Comment    TSH, 3rd generation    Collection Time: 04/07/25  9:21 AM   Result Value Ref Range    TSH 1.390 0.450 - 4.500 uIU/mL

## 2025-04-16 NOTE — PROGRESS NOTES
Adult Annual Physical  Name: Carlos A Gloria      : 1965      MRN: [id number]  Encounter Provider: Js Lugo MD  Encounter Date: 2025   Encounter department: [facility]    :  Assessment & Plan  Routine general medical examination at a health care facility         Mixed hyperlipidemia    Orders:  •  CT coronary calcium score; Future    Elevated BP without diagnosis of hypertension         Dilated aortic root (HCC)         Screening for hypothyroidism         Encounter for prostate cancer screening         Colon cancer screening    Orders:  •  POCT hemoccult screening    Annual physical exam         Left inguinal hernia    Orders:  •  Ambulatory Referral to General Surgery; Future        Preventive Screenings:  - Diabetes Screening: screening up-to-date  - Cholesterol Screening: screening not indicated and has hyperlipidemia   - Hepatitis C screening: screening up-to-date   - HIV screening: screening up-to-date   - Colon cancer screening: screening up-to-date   - Lung cancer screening: screening not indicated   - Prostate cancer screening: screening up-to-date     Immunizations:  - Immunizations due: Influenza and Zoster (Shingrix)      Depression Screening and Follow-up Plan: Patient was screened for depression during today's encounter. They screened negative with a PHQ-2 score of 0.          History of Present Illness     Adult Annual Physical:  Patient presents for annual physical. DISCUSSED HEALTH ISSUES  REVIEWED MEDICAL RECORD  NO CONCERNS AT THIS TIME    .     Diet and Physical Activity:  - Diet/Nutrition: well balanced diet and portion control.  - Exercise: moderate cardiovascular exercise and 3-4 times a week on average.    Depression Screening:  - PHQ-2 Score: 0    General Health:  - Sleep: 4-6 hours of sleep on average.  - Hearing: normal hearing right ear and normal hearing left ear.  - Vision: most recent eye exam < 1 year ago and wears contacts.  -  "Dental: regular dental visits, brushes teeth three times daily and does not floss.    /GYN Health:  - Follows with GYN: no.   - History of STDs: no     Health:  - History of STDs: no.   - Urinary symptoms: none.     Advanced Care Planning:  - Has an advanced directive?: yes    - Has a durable medical POA?: yes      Review of Systems   Constitutional:  Negative for chills, fatigue and fever.   HENT:  Negative for congestion, ear discharge, ear pain, mouth sores, postnasal drip, sore throat and trouble swallowing.    Eyes:  Negative for pain, discharge and visual disturbance.   Respiratory:  Negative for cough, shortness of breath and wheezing.    Cardiovascular:  Negative for chest pain, palpitations and leg swelling.   Gastrointestinal:  Negative for abdominal distention, abdominal pain, blood in stool, diarrhea and nausea.   Endocrine: Negative for polydipsia, polyphagia and polyuria.   Genitourinary:  Negative for dysuria, frequency, hematuria and urgency.   Musculoskeletal:  Negative for arthralgias, gait problem and joint swelling.   Skin:  Negative for pallor and rash.   Neurological:  Negative for dizziness, syncope, speech difficulty, weakness, light-headedness, numbness and headaches.   Hematological:  Negative for adenopathy.   Psychiatric/Behavioral:  Negative for behavioral problems, confusion and sleep disturbance. The patient is not nervous/anxious.          Objective   /88   Pulse (!) 54   Temp (!) 96.9 °F (36.1 °C) (Temporal)   Resp 18   Ht 5' 10\" (1.778 m)   Wt 96.6 kg (213 lb)   SpO2 95%   BMI 30.56 kg/m²     Physical Exam  Constitutional:       General: He is not in acute distress.     Appearance: Normal appearance. He is well-developed. He is not ill-appearing.   HENT:      Head: Normocephalic and atraumatic.      Right Ear: Tympanic membrane and external ear normal.      Left Ear: Tympanic membrane and external ear normal.      Nose: Nose normal.      Mouth/Throat:      Mouth: " Mucous membranes are moist.   Eyes:      General:         Right eye: No discharge.         Left eye: No discharge.      Conjunctiva/sclera: Conjunctivae normal.      Pupils: Pupils are equal, round, and reactive to light.   Neck:      Thyroid: No thyromegaly.      Vascular: No JVD.   Cardiovascular:      Rate and Rhythm: Normal rate and regular rhythm.      Heart sounds: Normal heart sounds. No murmur heard.  Pulmonary:      Effort: Pulmonary effort is normal.      Breath sounds: Normal breath sounds. No wheezing or rales.   Abdominal:      General: Bowel sounds are normal.      Palpations: Abdomen is soft. There is no mass.      Tenderness: There is no abdominal tenderness. There is no guarding or rebound.      Hernia: A hernia is present.      Comments: EARLY L INGUINAL HERNIA   Genitourinary:     Penis: Normal.       Testes: Normal.      Prostate: Normal.      Rectum: Normal. Guaiac result negative.   Musculoskeletal:         General: No tenderness or deformity. Normal range of motion.      Cervical back: Neck supple.   Lymphadenopathy:      Cervical: No cervical adenopathy.   Skin:     General: Skin is warm and dry.      Findings: No erythema or rash.   Neurological:      General: No focal deficit present.      Mental Status: He is alert and oriented to person, place, and time.      Cranial Nerves: No cranial nerve deficit.      Sensory: No sensory deficit.      Motor: No weakness or abnormal muscle tone.      Coordination: Coordination normal.      Gait: Gait normal.      Deep Tendon Reflexes: Reflexes are normal and symmetric. Reflexes normal.   Psychiatric:         Mood and Affect: Mood normal.         Behavior: Behavior normal.         Thought Content: Thought content normal.         Judgment: Judgment normal.

## 2025-04-24 ENCOUNTER — HOSPITAL ENCOUNTER (OUTPATIENT)
Dept: CT IMAGING | Facility: HOSPITAL | Age: 60
Discharge: HOME | End: 2025-04-24
Attending: FAMILY MEDICINE
Payer: COMMERCIAL

## 2025-04-24 DIAGNOSIS — E78.2 MIXED HYPERLIPIDEMIA: ICD-10-CM

## 2025-04-24 PROCEDURE — 75571 CT HRT W/O DYE W/CA TEST: CPT

## 2025-04-28 ENCOUNTER — RESULTS FOLLOW-UP (OUTPATIENT)
Dept: FAMILY MEDICINE CLINIC | Facility: CLINIC | Age: 60
End: 2025-04-28

## 2025-04-28 ENCOUNTER — CONSULT (OUTPATIENT)
Age: 60
End: 2025-04-28
Payer: COMMERCIAL

## 2025-04-28 VITALS
SYSTOLIC BLOOD PRESSURE: 148 MMHG | WEIGHT: 215.6 LBS | BODY MASS INDEX: 30.86 KG/M2 | TEMPERATURE: 96.7 F | HEART RATE: 57 BPM | OXYGEN SATURATION: 97 % | HEIGHT: 70 IN | DIASTOLIC BLOOD PRESSURE: 101 MMHG

## 2025-04-28 DIAGNOSIS — K40.90 LEFT INGUINAL HERNIA: Primary | ICD-10-CM

## 2025-04-28 PROCEDURE — 99204 OFFICE O/P NEW MOD 45 MIN: CPT | Performed by: SURGERY

## 2025-04-28 NOTE — PROGRESS NOTES
Name: Carlos A Gloria      : 1965      MRN: [id number]  Encounter Provider: Wes Yoo MD  Encounter Date: 2025   Encounter department: [facility]  :  Assessment & Plan  Left inguinal hernia           1.  CMP and CBC 2025 largely unremarkable.  2.  EKG 15 April 2025.  3.  Plan left inguinal hernia repair with mesh.    History of Present Illness   HPI  Carlos A Gloria is a 60 y.o. male who presents with referral from his PCP, Dr. Lugo, for left inguinal hernia.  Patient reports some discomfort in this area and a left inguinal hernia was noted on physical exam.    Patient has no chronic pulmonary or bowel or bladder obstructive complaints.        Review of Systems   Constitutional:  Negative for chills and fever.   Respiratory: Negative.     Cardiovascular: Negative.    Gastrointestinal: Negative.    Genitourinary: Negative.    Musculoskeletal: Negative.    Neurological: Negative.    Hematological: Negative.    All other systems reviewed and are negative.    Medical History Reviewed by provider this encounter:     .  Past Medical History   Past Medical History:   Diagnosis Date    ED (erectile dysfunction)     Hyperlipidemia      Past Surgical History:   Procedure Laterality Date    ADENOIDECTOMY      APPENDECTOMY      COLONOSCOPY      GALLBLADDER SURGERY       Family History   Problem Relation Age of Onset    Coronary artery disease Father 71        CAD involving CABG of native heart with other forms of angina pectoris    Hypertension Father         Essential Hypertension    Heart attack Father         Myocardial Infarction involving other coronary artery    Heart attack Cousin         Myocardial Infarction involving other coronary artery    Mental illness Neg Hx     Substance Abuse Neg Hx       reports that he has never smoked. He has never been exposed to tobacco smoke. He has never used smokeless tobacco. He reports current alcohol use. He reports that he  does not use drugs.  Current Outpatient Medications   Medication Instructions    aspirin (ECOTRIN LOW STRENGTH) 81 mg, Oral, Daily    atorvastatin (LIPITOR) 20 mg, Oral, Daily    sildenafil (VIAGRA) 100 mg, Oral, As needed   No Known Allergies   Current Outpatient Medications on File Prior to Visit   Medication Sig Dispense Refill    aspirin (ECOTRIN LOW STRENGTH) 81 mg EC tablet Take 1 tablet (81 mg total) by mouth daily 90 tablet 1    atorvastatin (LIPITOR) 20 mg tablet Take 1 tablet (20 mg total) by mouth daily 90 tablet 1    sildenafil (VIAGRA) 100 mg tablet Take 1 tablet (100 mg total) by mouth as needed for erectile dysfunction 10 tablet 5     No current facility-administered medications on file prior to visit.      Social History     Tobacco Use    Smoking status: Never     Passive exposure: Never    Smokeless tobacco: Never   Vaping Use    Vaping status: Never Used   Substance and Sexual Activity    Alcohol use: Yes     Comment: Minimum alcohol consumption    Drug use: No    Sexual activity: Not on file        Objective   There were no vitals taken for this visit.     Physical Exam  Vitals reviewed.   Constitutional:       General: He is not in acute distress.     Appearance: Normal appearance.   Cardiovascular:      Rate and Rhythm: Normal rate and regular rhythm.   Pulmonary:      Effort: Pulmonary effort is normal. No respiratory distress.      Breath sounds: Normal breath sounds.   Abdominal:      General: Abdomen is flat. There is no distension.      Palpations: Abdomen is soft.      Comments: Small reducible left inguinal hernia.  No right inguinal hernia.   Musculoskeletal:         General: Normal range of motion.      Cervical back: Normal range of motion and neck supple.   Lymphadenopathy:      Cervical: No cervical adenopathy.   Skin:     General: Skin is warm and dry.   Neurological:      General: No focal deficit present.      Mental Status: He is alert.

## 2025-04-28 NOTE — TELEPHONE ENCOUNTER
Pt called, appt scheduled for 05/27/25.  Patient was added to cancellation list, and informed he would receive a call if a sooner appt was available.

## 2025-04-30 DIAGNOSIS — R93.1 ELEVATED CORONARY ARTERY CALCIUM SCORE: Primary | ICD-10-CM

## 2025-05-09 ENCOUNTER — TELEPHONE (OUTPATIENT)
Dept: FAMILY MEDICINE CLINIC | Facility: CLINIC | Age: 60
End: 2025-05-09

## 2025-05-09 NOTE — TELEPHONE ENCOUNTER
[facility] w [facility] if needed  Trying to schedule by 5/16/25  [facility] [address] [address]. [phone]  NPI [id number]    (Faxed order to [facility] at fax# [phone])

## 2025-05-12 NOTE — TELEPHONE ENCOUNTER
Yajaira from [facility] calling to see if PA was started.  I let her know it is pending review at this time.

## 2025-05-14 ENCOUNTER — TELEPHONE (OUTPATIENT)
Age: 60
End: 2025-05-14

## 2025-05-14 NOTE — TELEPHONE ENCOUNTER
This message is a Duplicate.  See below for details copied from  previous message:    Radha- [facility] contacted HALIE Luna MA      5/14/25 11:23 AM  Note     Radha calling from [facility] requesting clinical notes on patient.  Called office clinical to verify if this is something we send as this states Dr. Lugo did not order and will not be doing the peer to peer.  Called clinical and spoke with Judie, she kindly asked that I take Radha's contact info for Danuta to call her back.  Radha can reached at [phone] ex:[phone]          5/14/25 11:23 AM  Mariajose Song MA routed this conversation to Me   [facility]    5/14/25 11:39 AM  Note     I returned Radha's call.  Radha states Edil is going to be paying for this test out of pocket.  Radha asked for clinical notes to support this test.  I informed Radha, there are no clinical notes.  Dr Lugo ordered this test at the patient's request for his cardiologist.  I informed Radha to contact the patient or his cardiologist.  Radha agreed.  No further action required on this message.

## 2025-05-14 NOTE — TELEPHONE ENCOUNTER
Radha calling from [facility] requesting clinical notes on patient.  Called office clinical to verify if this is something we send as this states Dr. Lugo did not order and will not be doing the peer to peer.  Called clinical and spoke with Judie, she kindly asked that I take Radha's contact info for Danuta to call her back.  Radha can reached at [phone] ex:[phone]

## 2025-05-14 NOTE — TELEPHONE ENCOUNTER
I returned Radha's call.  Radha states Edil is going to be paying for this test out of pocket.  Radha asked for clinical notes to support this test.  I informed Radha, there are no clinical notes.  Dr Lugo ordered this test at the patient's request for his cardiologist.  I informed Radha to contact the patient or his cardiologist.  Radha agreed.  No further action required on this message.

## 2025-05-14 NOTE — TELEPHONE ENCOUNTER
Lucille called from [facility] requesting clinical notes in regards to a CT scan that was ordered by provider.Please fax to [phone].

## 2025-05-14 NOTE — TELEPHONE ENCOUNTER
Dr Lugo, see copied staff message from [facility]:    The prior authorization request for this study has not been approved. You can schedule a peer to peer by calling Dee @ [phone] with the deadline date of 5/16/2025. The insurance determined the following:    [X  ] Does not meet Medical Necessity (For 69463 Computed Tomography (CT), a special kind of picture of your heart with contrast  (dye) Your doctor told us that there is a concern related to the blood vessels in your heart. An  imaging study was asked for. We cannot approve this request because:  Notes from your doctor must show you have pain, pressure or discomfort in your chest, upper  belly, shoulder, arm or jaw that occurs with stress (physical or mental). These symptoms must be  new, or have returned, persisted, or worsened. The notes must also show these symptoms  improved with rest and/or drugs (nitroglycerin). The notes sent to us do not show this applies to  You  For 67650 Noninvasive Estimate Of Coronary Fractional Flow Reserve (FFR), a special kind of  test done with a special picture of your heart (CT angiography) to look at flow through the blood  vessels supplying the heart Your doctor told us that there is a concern related to the blood vessels  that carry blood to your heart. An imaging study was asked for. We cannot approve this request  because:  Imaging can be done when needed to further assess CAD (coronary artery disease) of unknown  importance seen on recent imaging (CT of the blood vessels in your heart). CAD is a disease to  the blood vessels that provide blood to your heart muscle. The notes sent to us do not show it is  needed for this reason)    [ ] No prior imaging  [ ] PT or conservative treatment incomplete  [ ] Missing Labs  [ ] Frequency    If you choose not to complete a peer to peer then please reply to this message to let us know. Please notify your patient

## 2025-05-14 NOTE — TELEPHONE ENCOUNTER
I verbally spoke to Dr Lugo who stated he ordered at patient's request for his cardiologist.  Dr Lugo states he will not be doing a peer to peer.      Patient informed of the denial and states he understands.  Edil states he will call the facility and discuss paying out of pocket.  No further action required on this message.

## 2025-05-19 ENCOUNTER — RA CDI HCC (OUTPATIENT)
Dept: OTHER | Facility: HOSPITAL | Age: 60
End: 2025-05-19

## 2025-05-19 NOTE — PROGRESS NOTES
HCC coding opportunities       Chart reviewed, no opportunity found: CHART REVIEWED, NO OPPORTUNITY FOUND        Patients Insurance        Commercial Insurance: [employer]

## 2025-05-22 ENCOUNTER — RESULTS FOLLOW-UP (OUTPATIENT)
Dept: FAMILY MEDICINE CLINIC | Facility: CLINIC | Age: 60
End: 2025-05-22

## 2025-05-27 ENCOUNTER — TELEPHONE (OUTPATIENT)
Dept: FAMILY MEDICINE CLINIC | Facility: CLINIC | Age: 60
End: 2025-05-27

## 2025-05-27 NOTE — TELEPHONE ENCOUNTER
Edil was scheduled for 5/27.  He really wanted to talk to your regarding his CT Coronary Calcium score results.    I know Friday you are just taking problem appts.  I scheduled him for weeks away, 6/11    Are you willing to see Edil on Friday?

## 2025-05-28 ENCOUNTER — ANESTHESIA EVENT (OUTPATIENT)
Dept: PERIOP | Facility: HOSPITAL | Age: 60
End: 2025-05-28
Payer: COMMERCIAL

## 2025-05-28 NOTE — PRE-PROCEDURE INSTRUCTIONS
Pre-Surgery Instructions:   Medication Instructions    aspirin (ECOTRIN LOW STRENGTH) 81 mg EC tablet Follow instructions from [facility] paperwork. Pt states he was advised to hold x5-7 days    atorvastatin (LIPITOR) 20 mg tablet Take night before surgery    sildenafil (VIAGRA) 100 mg tablet Stop taking 1 day prior to surgery.      Medication instructions for day of surgery reviewed. Please take all instructed medications with only a sip of water.       You will receive a call one business day prior to surgery with an arrival time and hospital directions. If your surgery is scheduled on a Monday, the hospital will be calling you on the Friday prior to your surgery. If you have not heard from anyone by 8pm, please call the hospital supervisor through the hospital  at [phone]. ([place] [phone] or [place] [phone]).    Do not eat or drink anything after midnight the night before your surgery, including candy, mints, lifesavers, or chewing gum. Do not drink alcohol 24hrs before your surgery. Try not to smoke at least 24hrs before your surgery.       Follow the pre surgery showering instructions as listed in the “My Surgical Experience Booklet” or otherwise provided by your surgeon's office. Do not use a blade to shave the surgical area 1 week before surgery. It is okay to use a clean electric clippers up to 24 hours before surgery. Do not apply any lotions, creams, including makeup, cologne, deodorant, or perfumes after showering on the day of your surgery. Do not use dry shampoo, hair spray, hair gel, or any type of hair products.     No contact lenses, eye make-up, or artificial eyelashes. Remove nail polish, including gel polish, and any artificial, gel, or acrylic nails if possible. Remove all jewelry including rings and body piercing jewelry.     Wear causal clothing that is easy to take on and off. Consider your type of surgery.    Keep any valuables, jewelry, piercings at home. Please bring any  specially ordered equipment (sling, braces) if indicated.    Arrange for a responsible person to drive you to and from the hospital on the day of your surgery. Please confirm the visitor policy for the day of your procedure when you receive your phone call with an arrival time.     Call the surgeon's office with any new illnesses, exposures, or additional questions prior to surgery.    Please reference your “My Surgical Experience Booklet” for additional information to prepare for your upcoming surgery.

## 2025-05-30 ENCOUNTER — TELEPHONE (OUTPATIENT)
Dept: FAMILY MEDICINE CLINIC | Facility: CLINIC | Age: 60
End: 2025-05-30

## 2025-05-30 ENCOUNTER — OFFICE VISIT (OUTPATIENT)
Dept: FAMILY MEDICINE CLINIC | Facility: CLINIC | Age: 60
End: 2025-05-30
Payer: COMMERCIAL

## 2025-05-30 VITALS
HEART RATE: 76 BPM | SYSTOLIC BLOOD PRESSURE: 128 MMHG | HEIGHT: 70 IN | OXYGEN SATURATION: 96 % | WEIGHT: 209 LBS | BODY MASS INDEX: 29.92 KG/M2 | DIASTOLIC BLOOD PRESSURE: 88 MMHG | TEMPERATURE: 97.2 F | RESPIRATION RATE: 16 BRPM

## 2025-05-30 DIAGNOSIS — R93.1 ELEVATED CORONARY ARTERY CALCIUM SCORE: Primary | ICD-10-CM

## 2025-05-30 DIAGNOSIS — E78.2 MIXED HYPERLIPIDEMIA: ICD-10-CM

## 2025-05-30 DIAGNOSIS — I77.810 DILATED AORTIC ROOT (HCC): ICD-10-CM

## 2025-05-30 PROCEDURE — 99213 OFFICE O/P EST LOW 20 MIN: CPT | Performed by: FAMILY MEDICINE

## 2025-05-30 NOTE — TELEPHONE ENCOUNTER
Dr. Lugo ordered an echo complete w/contrast if indicated    Carlos A will be using   [facility]  [address] [place]  [address]  NPI:  [id number]    CPT: 51103

## 2025-06-01 NOTE — ASSESSMENT & PLAN NOTE
Orders:  •  Stress test only, exercise; Future  •  Echo complete w/ contrast if indicated; Future

## 2025-06-01 NOTE — PROGRESS NOTES
"Name: Carlos A Gloria      : 1965      MRN: [id number]  Encounter Provider: Js Lugo MD  Encounter Date: 2025   Encounter department: [facility]  :  Assessment & Plan  Elevated coronary artery calcium score    DISCUSSED RECENT SCAN RESULTS    DISCUSSED THERAPEUTIC OPTIONS  CARDIOLOGY CONSULT IS SCHEDULED    Orders:  •  Stress test only, exercise; Future  •  Echo complete w/ contrast if indicated; Future    Mixed hyperlipidemia    Orders:  •  Stress test only, exercise; Future  •  Echo complete w/ contrast if indicated; Future    Dilated aortic root (HCC)    Orders:  •  Stress test only, exercise; Future  •  Echo complete w/ contrast if indicated; Future           History of Present Illness   DISCUSSION      Review of Systems   Constitutional:  Negative for chills, fatigue and fever.   HENT:  Negative for sore throat.    Eyes:  Negative for discharge.   Respiratory:  Negative for cough and chest tightness.    Cardiovascular:  Negative for chest pain and palpitations.   Gastrointestinal:  Negative for abdominal pain, diarrhea, nausea and vomiting.   Musculoskeletal:  Negative for arthralgias and gait problem.   Neurological:  Negative for dizziness, weakness and headaches.   Hematological:  Negative for adenopathy.   Psychiatric/Behavioral:  The patient is not nervous/anxious.        Objective   /88   Pulse 76   Temp (!) 97.2 °F (36.2 °C) (Temporal)   Resp 16   Ht 5' 10\" (1.778 m)   Wt 94.8 kg (209 lb)   SpO2 96%   BMI 29.99 kg/m²      Physical Exam    NO PE WAS PERFORMED  Administrative Statements   I have spent a total time of 20 minutes in caring for this patient on the day of the visit/encounter including Diagnostic results, Instructions for management, and Impressions.  "

## 2025-06-01 NOTE — ASSESSMENT & PLAN NOTE
DISCUSSED RECENT SCAN RESULTS    DISCUSSED THERAPEUTIC OPTIONS  CARDIOLOGY CONSULT IS SCHEDULED    Orders:  •  Stress test only, exercise; Future  •  Echo complete w/ contrast if indicated; Future

## 2025-06-01 NOTE — PATIENT INSTRUCTIONS
CONTINUE CURRENT TREATMENT PLAN  MONITOR DIETARY SODIUM, CHOL INTAKE  ENCOURAGE PHYSICAL ACTIVITY    CARDIOLOGY FOLLOW UP  EST  ECHO    RV 6 M, SOONER PRN

## 2025-06-06 ENCOUNTER — HOSPITAL ENCOUNTER (OUTPATIENT)
Facility: HOSPITAL | Age: 60
Setting detail: OUTPATIENT SURGERY
Discharge: HOME | End: 2025-06-06
Attending: SURGERY | Admitting: SURGERY
Payer: COMMERCIAL

## 2025-06-06 ENCOUNTER — ANESTHESIA (OUTPATIENT)
Dept: PERIOP | Facility: HOSPITAL | Age: 60
End: 2025-06-06
Payer: COMMERCIAL

## 2025-06-06 VITALS
SYSTOLIC BLOOD PRESSURE: 117 MMHG | HEIGHT: 71 IN | HEART RATE: 50 BPM | WEIGHT: 207.45 LBS | TEMPERATURE: 97.1 F | DIASTOLIC BLOOD PRESSURE: 74 MMHG | OXYGEN SATURATION: 96 % | RESPIRATION RATE: 16 BRPM | BODY MASS INDEX: 29.04 KG/M2

## 2025-06-06 DIAGNOSIS — K40.90 LEFT INGUINAL HERNIA: Primary | ICD-10-CM

## 2025-06-06 PROCEDURE — 88302 TISSUE EXAM BY PATHOLOGIST: CPT | Performed by: PATHOLOGY

## 2025-06-06 PROCEDURE — NC001 PR NO CHARGE: Performed by: SURGERY

## 2025-06-06 PROCEDURE — C1781 MESH (IMPLANTABLE): HCPCS | Performed by: SURGERY

## 2025-06-06 PROCEDURE — 49505 PRP I/HERN INIT REDUC >5 YR: CPT | Performed by: SURGERY

## 2025-06-06 PROCEDURE — 49505 PRP I/HERN INIT REDUC >5 YR: CPT | Performed by: PHYSICIAN ASSISTANT

## 2025-06-06 DEVICE — BARD MESH, 1" X 4" (2.5 CM X 10 CM)
Type: IMPLANTABLE DEVICE | Site: INGUINAL | Status: FUNCTIONAL
Brand: BARD

## 2025-06-06 RX ORDER — SODIUM CHLORIDE, SODIUM LACTATE, POTASSIUM CHLORIDE, CALCIUM CHLORIDE 600; 310; 30; 20 MG/100ML; MG/100ML; MG/100ML; MG/100ML
100 INJECTION, SOLUTION INTRAVENOUS CONTINUOUS
Status: DISCONTINUED | OUTPATIENT
Start: 2025-06-06 | End: 2025-06-06 | Stop reason: HOSPADM

## 2025-06-06 RX ORDER — KETOROLAC TROMETHAMINE 30 MG/ML
INJECTION, SOLUTION INTRAMUSCULAR; INTRAVENOUS AS NEEDED
Status: DISCONTINUED | OUTPATIENT
Start: 2025-06-06 | End: 2025-06-06

## 2025-06-06 RX ORDER — GLYCOPYRROLATE 0.2 MG/ML
INJECTION INTRAMUSCULAR; INTRAVENOUS AS NEEDED
Status: DISCONTINUED | OUTPATIENT
Start: 2025-06-06 | End: 2025-06-06

## 2025-06-06 RX ORDER — EPHEDRINE SULFATE 50 MG/ML
INJECTION INTRAVENOUS AS NEEDED
Status: DISCONTINUED | OUTPATIENT
Start: 2025-06-06 | End: 2025-06-06

## 2025-06-06 RX ORDER — MAGNESIUM HYDROXIDE 1200 MG/15ML
LIQUID ORAL AS NEEDED
Status: DISCONTINUED | OUTPATIENT
Start: 2025-06-06 | End: 2025-06-06 | Stop reason: HOSPADM

## 2025-06-06 RX ORDER — BUPIVACAINE HYDROCHLORIDE AND EPINEPHRINE 5; 5 MG/ML; UG/ML
INJECTION, SOLUTION PERINEURAL AS NEEDED
Status: DISCONTINUED | OUTPATIENT
Start: 2025-06-06 | End: 2025-06-06 | Stop reason: HOSPADM

## 2025-06-06 RX ORDER — MIDAZOLAM HYDROCHLORIDE 2 MG/2ML
INJECTION, SOLUTION INTRAMUSCULAR; INTRAVENOUS AS NEEDED
Status: DISCONTINUED | OUTPATIENT
Start: 2025-06-06 | End: 2025-06-06

## 2025-06-06 RX ORDER — LIDOCAINE HYDROCHLORIDE 10 MG/ML
INJECTION, SOLUTION EPIDURAL; INFILTRATION; INTRACAUDAL; PERINEURAL AS NEEDED
Status: DISCONTINUED | OUTPATIENT
Start: 2025-06-06 | End: 2025-06-06

## 2025-06-06 RX ORDER — FENTANYL CITRATE 50 UG/ML
INJECTION, SOLUTION INTRAMUSCULAR; INTRAVENOUS AS NEEDED
Status: DISCONTINUED | OUTPATIENT
Start: 2025-06-06 | End: 2025-06-06

## 2025-06-06 RX ORDER — ONDANSETRON 2 MG/ML
4 INJECTION INTRAMUSCULAR; INTRAVENOUS ONCE AS NEEDED
Status: DISCONTINUED | OUTPATIENT
Start: 2025-06-06 | End: 2025-06-06 | Stop reason: HOSPADM

## 2025-06-06 RX ORDER — PROPOFOL 10 MG/ML
INJECTION, EMULSION INTRAVENOUS AS NEEDED
Status: DISCONTINUED | OUTPATIENT
Start: 2025-06-06 | End: 2025-06-06

## 2025-06-06 RX ORDER — OXYCODONE AND ACETAMINOPHEN 5; 325 MG/1; MG/1
1 TABLET ORAL EVERY 6 HOURS PRN
Qty: 8 TABLET | Refills: 0 | Status: SHIPPED | OUTPATIENT
Start: 2025-06-06

## 2025-06-06 RX ORDER — CEFAZOLIN SODIUM 2 G/50ML
2000 SOLUTION INTRAVENOUS ONCE
Status: COMPLETED | OUTPATIENT
Start: 2025-06-06 | End: 2025-06-06

## 2025-06-06 RX ORDER — FENTANYL CITRATE/PF 50 MCG/ML
50 SYRINGE (ML) INJECTION
Status: DISCONTINUED | OUTPATIENT
Start: 2025-06-06 | End: 2025-06-06 | Stop reason: HOSPADM

## 2025-06-06 RX ADMIN — FENTANYL CITRATE 50 MCG: 50 INJECTION INTRAMUSCULAR; INTRAVENOUS at 08:57

## 2025-06-06 RX ADMIN — EPHEDRINE SULFATE 10 MG: 50 INJECTION, SOLUTION INTRAVENOUS at 07:46

## 2025-06-06 RX ADMIN — CEFAZOLIN SODIUM 2000 MG: 2 SOLUTION INTRAVENOUS at 07:34

## 2025-06-06 RX ADMIN — MIDAZOLAM 2 MG: 1 INJECTION INTRAMUSCULAR; INTRAVENOUS at 07:24

## 2025-06-06 RX ADMIN — FENTANYL CITRATE 25 MCG: 50 INJECTION, SOLUTION INTRAMUSCULAR; INTRAVENOUS at 07:49

## 2025-06-06 RX ADMIN — SODIUM CHLORIDE, SODIUM LACTATE, POTASSIUM CHLORIDE, AND CALCIUM CHLORIDE 100 ML/HR: .6; .31; .03; .02 INJECTION, SOLUTION INTRAVENOUS at 09:22

## 2025-06-06 RX ADMIN — FENTANYL CITRATE 25 MCG: 50 INJECTION, SOLUTION INTRAMUSCULAR; INTRAVENOUS at 08:15

## 2025-06-06 RX ADMIN — SODIUM CHLORIDE, SODIUM LACTATE, POTASSIUM CHLORIDE, AND CALCIUM CHLORIDE 100 ML/HR: .6; .31; .03; .02 INJECTION, SOLUTION INTRAVENOUS at 06:31

## 2025-06-06 RX ADMIN — LIDOCAINE HYDROCHLORIDE 50 MG: 10 INJECTION, SOLUTION EPIDURAL; INFILTRATION; INTRACAUDAL; PERINEURAL at 07:31

## 2025-06-06 RX ADMIN — FENTANYL CITRATE 50 MCG: 50 INJECTION, SOLUTION INTRAMUSCULAR; INTRAVENOUS at 07:38

## 2025-06-06 RX ADMIN — PROPOFOL 200 MG: 10 INJECTION, EMULSION INTRAVENOUS at 07:31

## 2025-06-06 RX ADMIN — FENTANYL CITRATE 50 MCG: 50 INJECTION INTRAMUSCULAR; INTRAVENOUS at 09:07

## 2025-06-06 RX ADMIN — KETOROLAC TROMETHAMINE 15 MG: 30 INJECTION, SOLUTION INTRAMUSCULAR at 08:15

## 2025-06-06 RX ADMIN — GLYCOPYRROLATE 0.2 MG: 0.2 INJECTION, SOLUTION INTRAMUSCULAR; INTRAVENOUS at 07:45

## 2025-06-06 NOTE — INTERVAL H&P NOTE
H&P reviewed. After examining the patient I find no changes in the patients condition since the H&P had been written.    Vitals:    06/06/25 0626   BP: 148/96   Pulse: (!) 51   Resp: 16   Temp: 98.1 °F (36.7 °C)   SpO2: 98%     Patient ready for left inguinal hernia repair with mesh.    Patient declines all blood or blood product transfusions and form was completed and signed.

## 2025-06-06 NOTE — PERIOPERATIVE NURSING NOTE
Received from [facility], call guido in reach, rails up. Left lw abd. Incision dry, intact. Ice to left groin area, soda to drink. Aware of need to void prior to discharge

## 2025-06-06 NOTE — DISCHARGE INSTR - AVS FIRST PAGE
1.  Keep incision clean and dry for 2 days.  After 2 days, it may get wet in the shower.  No dressing is required.  2.  Take ibuprofen, 600 mg, 3 times a day regularly for the next 2 to 3 days.  You may also use a cold compress to assist with discomfort.  3.  Take Percocet, in addition to the ibuprofen, if you need further pain control.  4.  Follow-up as already scheduled on 17 June at 9:30 AM.  If you have any questions before then, call the office at [phone].

## 2025-06-06 NOTE — PERIOPERATIVE NURSING NOTE
Sister at bedside, pt given water and coffee to drink.  Still feels like unable to void, call bell in reach.

## 2025-06-06 NOTE — OP NOTE
OPERATIVE REPORT  PATIENT NAME: Carlos A Gloria    :  1965  MRN: [id number]  Pt Location: [facility] OR ROOM 01    SURGERY DATE: 2025    Surgeons and Role:     * Wes Yoo MD - Primary     * Liu Lozano PA-C - Assisting    Preop Diagnosis:  Left inguinal hernia [K40.90]    Post-Op Diagnosis Codes:     * Left inguinal hernia [K40.90]    Procedure(s):  Left - REPAIR HERNIA INGUINAL    Specimen(s):  ID Type Source Tests Collected by Time Destination   1 :  Tissue Hernia Sac, Left Inguinal TISSUE EXAM Wes Yoo MD 2025 0801        Estimated Blood Loss:   Minimal    Drains:  * No LDAs found *    Anesthesia Type:   General    Operative Indications:  Left inguinal hernia [K40.90]      Operative Findings:  Large indirect and small direct inguinal hernia on the left       Complications:   None    Procedure and Technique:  Left inguinal hernia repair with mesh.    Patient was taken back to main operating, placed supine the operative table, general anesthesia was induced, and the left groin was prepped and draped in normal fashion.    Utilizing an incision superior to the medial aspect of the inguinal ligament, skin was incised.  Soft tissue was divided Bovie cautery.  The external bleak aponeurosis was opened in the direction of its fibers and the ilioinguinal nerve was sacrificed.    The Penrose drain was placed around all spermatic cord contents.  There was a small direct hernia that was easily reduced.  The cremaster muscles were opened in the direction of its fibers a cord lipoma was excised.  The indirect hernia sac was dissected free from the spermatic cord and opened.  Under direct observation, the sac underwent high ligation with 2-0 silk suture.  The distal portion of the sac was sent off the field as a specimen.  The inguinal canal floor was reconstructed utilizing polypropylene mesh.  This was sewn in a continuous fashion to the shelving edge of the inguinal ligament inferior laterally as  well as to the conjoined tendon superior and medially.  Tails of the mesh were wrapped around the spermatic cord to fashion a new deep ring.    The spermatic cord was returned to its anatomical position in the inguinal canal.  The external bleak aponeurosis was closed with 2-0 Vicryl.  3-0 Vicryl was used approximate Aiden's fascia as well as the dermis.  4-0 Monocryl was used approximate the skin edges.  Exofin was placed as a dressing.  A large field block was created around the incision at the end of the case.    The patient woke from general anesthesia, was extubated in the operating room, and sent to the PACU in stable condition.    MEGHNA Lozano was required for technical assistance and retraction.   I was present for the entire procedure., A qualified resident physician was not available., and A physician assistant was required during the procedure for retraction, tissue handling, dissection and suturing.    Patient Disposition:  PACU          SIGNATURE: Wes Yoo MD  DATE: June 6, 2025  TIME: 8:30 AM

## 2025-06-06 NOTE — ANESTHESIA POSTPROCEDURE EVALUATION
Post-Op Assessment Note    CV Status:  Stable  Pain Score: 1    Pain management: adequate       Mental Status:  Alert and awake   Hydration Status:  Euvolemic   PONV Controlled:  Controlled   Airway Patency:  Patent     Post Op Vitals Reviewed: Yes    No anethesia notable event occurred.    Staff: CRNA           Last Filed PACU Vitals:  Vitals Value Taken Time   Temp 97.6    Pulse 70    /62    Resp 18    SpO2 99

## 2025-06-06 NOTE — ANESTHESIA POSTPROCEDURE EVALUATION
Post-Op Assessment Note    CV Status:  Stable  Pain Score: 1    Pain management: adequate       Mental Status:  Alert and awake   Hydration Status:  Euvolemic   PONV Controlled:  Controlled   Airway Patency:  Patent     Post Op Vitals Reviewed: Yes    No anethesia notable event occurred.    Staff: Anesthesiologist           Last Filed PACU Vitals:  Vitals Value Taken Time   Temp 97.1 °F (36.2 °C) 06/06/25 08:45   Pulse 51 06/06/25 09:16   /73 06/06/25 09:16   Resp 20 06/06/25 09:16   SpO2 95 % 06/06/25 09:17       Modified Rob:     Vitals Value Taken Time   Activity 2 06/06/25 08:57   Respiration 2 06/06/25 08:57   Circulation 2 06/06/25 08:57   Consciousness 2 06/06/25 08:57   Oxygen Saturation 2 06/06/25 08:57     Modified Rob Score: 10

## 2025-06-06 NOTE — H&P
Name: Carlos A Gloria      : 1965      MRN: [id number]  Encounter Provider: Wes Yoo MD  Encounter Date: 2025   Encounter department: [facility] GENERAL SURGERY [facility]  :  Assessment & Plan  Left inguinal hernia           1.  CMP and CBC 2025 largely unremarkable.  2.  EKG 15 April 2025.  3.  Plan left inguinal hernia repair with mesh.    History of Present Illness  HPI  Carlos A Gloria is a 60 y.o. male who presents with referral from his PCP, Dr. Lugo, for left inguinal hernia.  Patient reports some discomfort in this area and a left inguinal hernia was noted on physical exam.    Patient has no chronic pulmonary or bowel or bladder obstructive complaints.        Review of Systems   Constitutional:  Negative for chills and fever.   Respiratory: Negative.     Cardiovascular: Negative.    Gastrointestinal: Negative.    Genitourinary: Negative.    Musculoskeletal: Negative.    Neurological: Negative.    Hematological: Negative.    All other systems reviewed and are negative.    Medical History Reviewed by provider this encounter:     .  Past Medical History  Past Medical History:   Diagnosis Date    ED (erectile dysfunction)     Hyperlipidemia      Past Surgical History:   Procedure Laterality Date    ADENOIDECTOMY      APPENDECTOMY      COLONOSCOPY      GALLBLADDER SURGERY       Family History   Problem Relation Age of Onset    Coronary artery disease Father 71        CAD involving CABG of native heart with other forms of angina pectoris    Hypertension Father         Essential Hypertension    Heart attack Father         Myocardial Infarction involving other coronary artery    Heart attack Cousin         Myocardial Infarction involving other coronary artery    Mental illness Neg Hx     Substance Abuse Neg Hx       reports that he has never smoked. He has never been exposed to tobacco smoke. He has never used smokeless tobacco. He reports current alcohol use. He reports that he does  not use drugs.  Current Outpatient Medications   Medication Instructions    aspirin (ECOTRIN LOW STRENGTH) 81 mg, Oral, Daily    atorvastatin (LIPITOR) 20 mg, Oral, Daily    sildenafil (VIAGRA) 100 mg, Oral, As needed   No Known Allergies   Current Outpatient Medications on File Prior to Visit   Medication Sig Dispense Refill    aspirin (ECOTRIN LOW STRENGTH) 81 mg EC tablet Take 1 tablet (81 mg total) by mouth daily 90 tablet 1    atorvastatin (LIPITOR) 20 mg tablet Take 1 tablet (20 mg total) by mouth daily 90 tablet 1    sildenafil (VIAGRA) 100 mg tablet Take 1 tablet (100 mg total) by mouth as needed for erectile dysfunction 10 tablet 5     No current facility-administered medications on file prior to visit.      Social History     Tobacco Use    Smoking status: Never     Passive exposure: Never    Smokeless tobacco: Never   Vaping Use    Vaping status: Never Used   Substance and Sexual Activity    Alcohol use: Yes     Comment: Minimum alcohol consumption    Drug use: No    Sexual activity: Not on file        Objective  There were no vitals taken for this visit.     Physical Exam  Vitals reviewed.   Constitutional:       General: He is not in acute distress.     Appearance: Normal appearance.   Cardiovascular:      Rate and Rhythm: Normal rate and regular rhythm.   Pulmonary:      Effort: Pulmonary effort is normal. No respiratory distress.      Breath sounds: Normal breath sounds.   Abdominal:      General: Abdomen is flat. There is no distension.      Palpations: Abdomen is soft.      Comments: Small reducible left inguinal hernia.  No right inguinal hernia.   Musculoskeletal:         General: Normal range of motion.      Cervical back: Normal range of motion and neck supple.   Lymphadenopathy:      Cervical: No cervical adenopathy.   Skin:     General: Skin is warm and dry.   Neurological:      General: No focal deficit present.      Mental Status: He is alert.

## 2025-06-06 NOTE — ANESTHESIA PREPROCEDURE EVALUATION
Procedure:  REPAIR HERNIA INGUINAL (Left: Groin)    Relevant Problems   ANESTHESIA (within normal limits)      CARDIO   (+) Dilated aortic root (HCC)   (+) Elevated coronary artery calcium score   (+) Mixed hyperlipidemia      ENDO (within normal limits)      PULMONARY (within normal limits)        Physical Exam    Airway     Mallampati score: II  TM Distance: >3 FB  Neck ROM: full  Upper bite lip test: II  Mouth opening: >= 4 cm      Cardiovascular  Rhythm: regular, Rate: normal, Pulse is palpable.     Dental   No notable dental hx     Pulmonary  Pulmonary exam normal     Neurological    He appears awake, alert and oriented x3.      Other Findings        Anesthesia Plan  ASA Score- 2     Anesthesia Type- general with ASA Monitors.         Additional Monitors:     Airway Plan:            Plan Factors-Exercise tolerance (METS): >4 METS.    Chart reviewed. EKG reviewed.  Existing labs reviewed. Patient summary reviewed.    Patient is not a current smoker.              Induction- intravenous.    Postoperative Plan- .   Monitoring Plan - Monitoring plan - standard ASA monitoring  Post Operative Pain Plan - non-opiod analgesics        Informed Consent- Anesthetic plan and risks discussed with patient.  I personally reviewed this patient with the CRNA. Discussed and agreed on the Anesthesia Plan with the CRNA..      NPO Status:  Vitals Value Taken Time   Date of last liquid 06/05/25 06/06/25 06:17   Time of last liquid 2200 06/06/25 06:17   Date of last solid 06/05/25 06/06/25 06:17   Time of last solid 1900 06/06/25 06:17

## 2025-06-11 NOTE — TELEPHONE ENCOUNTER
Edil Baez's insurance denied the echo complete.  Do you want to do a peer to peer or start an appeal?    If you want me to start the appeal, What are the reason(s)?  I know CT calcium score was over 700.

## 2025-06-11 NOTE — TELEPHONE ENCOUNTER
Pauline    I tried to go to [facility] to see why they denied it and with the case # below it says that it was /cancelled?    I wanted to be able to tell Dr Lugo why they denied it so we knew why to do the peer to peer or appeal.

## 2025-06-12 PROCEDURE — 88302 TISSUE EXAM BY PATHOLOGIST: CPT | Performed by: PATHOLOGY

## 2025-06-12 NOTE — TELEPHONE ENCOUNTER
Spoke to Edil and informed him that his insurance did not approve the echo.  He should discuss it with his cardiologist.  Edil understood and has an appt with his cardiologist in about 2 weeks.

## 2025-06-17 ENCOUNTER — OFFICE VISIT (OUTPATIENT)
Age: 60
End: 2025-06-17

## 2025-06-17 VITALS — BODY MASS INDEX: 29.54 KG/M2 | HEIGHT: 71 IN | TEMPERATURE: 96.9 F | WEIGHT: 211 LBS

## 2025-06-17 DIAGNOSIS — Z09 SURGERY FOLLOW-UP EXAMINATION: Primary | ICD-10-CM

## 2025-06-17 PROCEDURE — 99024 POSTOP FOLLOW-UP VISIT: CPT | Performed by: SURGERY

## 2025-06-17 NOTE — PROGRESS NOTES
Patient status post left inguinal hernia pair 6 June 2025.  He is here for routine follow-up.  He reports minimal pain and no wound issues.    Pathology report:  Final Diagnosis   A. Hernia Sac, Left Inguinal, :  - Fibroadipose tissue, consistent with hernia sac      Is not healing nicely.  No signs infection.  Patient counseled.  Follow-up as needed.

## 2025-06-21 DIAGNOSIS — N52.9 ERECTILE DYSFUNCTION, UNSPECIFIED ERECTILE DYSFUNCTION TYPE: ICD-10-CM

## 2025-06-22 RX ORDER — SILDENAFIL 100 MG/1
100 TABLET, FILM COATED ORAL AS NEEDED
Qty: 10 TABLET | Refills: 1 | Status: SHIPPED | OUTPATIENT
Start: 2025-06-22

## 2025-08-17 ENCOUNTER — TELEMEDICINE (OUTPATIENT)
Dept: OTHER | Facility: HOSPITAL | Age: 60
End: 2025-08-17
Payer: COMMERCIAL

## 2025-08-17 DIAGNOSIS — R50.9 FEVER, UNSPECIFIED FEVER CAUSE: Primary | ICD-10-CM

## 2025-08-17 PROCEDURE — 99213 OFFICE O/P EST LOW 20 MIN: CPT | Performed by: PHYSICIAN ASSISTANT

## 2025-08-18 ENCOUNTER — OFFICE VISIT (OUTPATIENT)
Dept: URGENT CARE | Facility: CLINIC | Age: 60
End: 2025-08-18
Payer: COMMERCIAL

## 2025-08-18 VITALS
HEART RATE: 56 BPM | DIASTOLIC BLOOD PRESSURE: 80 MMHG | TEMPERATURE: 97.6 F | RESPIRATION RATE: 16 BRPM | SYSTOLIC BLOOD PRESSURE: 126 MMHG | OXYGEN SATURATION: 98 %

## 2025-08-18 DIAGNOSIS — R50.9 FEVER, UNSPECIFIED FEVER CAUSE: Primary | ICD-10-CM

## 2025-08-18 LAB
SARS-COV-2 AG UPPER RESP QL IA: NEGATIVE
VALID CONTROL: NORMAL

## 2025-08-18 PROCEDURE — 87811 SARS-COV-2 COVID19 W/OPTIC: CPT | Performed by: FAMILY MEDICINE

## 2025-08-18 PROCEDURE — 99203 OFFICE O/P NEW LOW 30 MIN: CPT | Performed by: FAMILY MEDICINE

## 2025-08-18 RX ORDER — ROSUVASTATIN CALCIUM 20 MG/1
20 TABLET, COATED ORAL DAILY
COMMUNITY
Start: 2025-07-03 | End: 2026-07-03

## 2025-08-18 RX ORDER — SULFAMETHOXAZOLE AND TRIMETHOPRIM 800; 160 MG/1; MG/1
1 TABLET ORAL 2 TIMES DAILY
COMMUNITY
Start: 2025-08-11 | End: 2025-08-18
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
## 2025-08-19 PROBLEM — I25.10 ASCVD (ARTERIOSCLEROTIC CARDIOVASCULAR DISEASE): Status: ACTIVE | Noted: 2025-07-03

## 2025-08-19 PROBLEM — I51.7 LVH (LEFT VENTRICULAR HYPERTROPHY): Status: ACTIVE | Noted: 2025-07-03

## 2025-08-19 PROBLEM — I10 ESSENTIAL HYPERTENSION: Status: ACTIVE | Noted: 2025-07-03

## 2025-08-19 PROBLEM — M19.012 DEGENERATIVE ARTHRITIS OF LEFT SHOULDER REGION: Status: ACTIVE | Noted: 2025-07-03

## (undated) DEVICE — SUT SILK 2-0 SH 30 IN K833H

## (undated) DEVICE — PACK GENERAL LF

## (undated) DEVICE — TOWEL SURG XR DETECT GREEN STRL RFD

## (undated) DEVICE — GLOVE INDICATOR PI UNDERGLOVE SZ 7.5 BLUE

## (undated) DEVICE — CHLORAPREP HI-LITE 26ML ORANGE

## (undated) DEVICE — SUT VICRYL 3-0 SH 27 IN J416H

## (undated) DEVICE — TIBURON LAPAROTOMY DRAPE: Brand: CONVERTORS

## (undated) DEVICE — SUT PROLENE 2-0 RB-1/RB-1 36 IN 8559H

## (undated) DEVICE — NEPTUNE E-SEP SMOKE EVACUATION PENCIL, COATED, 70MM BLADE, PUSH BUTTON SWITCH: Brand: NEPTUNE E-SEP

## (undated) DEVICE — SUT VICRYL 2-0 SH 27 IN UNDYED J417H

## (undated) DEVICE — STRL PENROSE DRAIN 18" X 1/4": Brand: CARDINAL HEALTH

## (undated) DEVICE — ADHESIVE SKIN HIGH VISCOSITY EXOFIN 1ML

## (undated) DEVICE — DRAPE UTILITY

## (undated) DEVICE — STERILE DOUBLE BASIN SET PACK: Brand: CARDINAL HEALTH

## (undated) DEVICE — SUT MONOCRYL 4-0 PC-5 18 IN Y823G

## (undated) DEVICE — INTENDED FOR TISSUE SEPARATION, AND OTHER PROCEDURES THAT REQUIRE A SHARP SURGICAL BLADE TO PUNCTURE OR CUT.: Brand: BARD-PARKER SAFETY BLADES SIZE 15, STERILE

## (undated) DEVICE — ASTOUND STANDARD SURGICAL GOWN, XL: Brand: CONVERTORS

## (undated) DEVICE — SUT VICRYL 3-0 18 IN J904T

## (undated) DEVICE — GLOVE SRG BIOGEL 8